# Patient Record
Sex: FEMALE | Race: WHITE | Employment: FULL TIME | URBAN - NONMETROPOLITAN AREA
[De-identification: names, ages, dates, MRNs, and addresses within clinical notes are randomized per-mention and may not be internally consistent; named-entity substitution may affect disease eponyms.]

---

## 2017-12-22 ENCOUNTER — HOSPITAL ENCOUNTER (OUTPATIENT)
Age: 47
Discharge: HOME | End: 2017-12-22
Payer: COMMERCIAL

## 2017-12-22 LAB
ALT SERPL-CCNC: 13 U/L (ref 5–33)
AST SERPL-CCNC: 12 U/L
CHOLESTEROL/HDL RATIO: 4.5
CHOLESTEROL: 217 MG/DL
HDLC SERPL-MCNC: 48 MG/DL
LDL CHOLESTEROL: 151 MG/DL (ref 0–130)
T4 TOTAL: 6.3 UG/DL (ref 4.5–12)
TRIGL SERPL-MCNC: 88 MG/DL
TSH SERPL DL<=0.05 MIU/L-ACNC: 2.36 MIU/L (ref 0.3–5)
VLDLC SERPL CALC-MCNC: ABNORMAL MG/DL (ref 1–30)

## 2017-12-22 PROCEDURE — 80061 LIPID PANEL: CPT

## 2017-12-22 PROCEDURE — 36415 COLL VENOUS BLD VENIPUNCTURE: CPT

## 2017-12-22 PROCEDURE — 84460 ALANINE AMINO (ALT) (SGPT): CPT

## 2017-12-22 PROCEDURE — 84436 ASSAY OF TOTAL THYROXINE: CPT

## 2017-12-22 PROCEDURE — 84443 ASSAY THYROID STIM HORMONE: CPT

## 2017-12-22 PROCEDURE — 84450 TRANSFERASE (AST) (SGOT): CPT

## 2019-08-02 RX ORDER — SIMVASTATIN 40 MG
40 TABLET ORAL NIGHTLY
COMMUNITY
End: 2019-09-03 | Stop reason: ALTCHOICE

## 2019-08-02 RX ORDER — LORATADINE 10 MG/1
10 TABLET ORAL DAILY
COMMUNITY

## 2019-08-02 RX ORDER — DESVENLAFAXINE 50 MG/1
50 TABLET, EXTENDED RELEASE ORAL DAILY
COMMUNITY
End: 2019-08-28 | Stop reason: ALTCHOICE

## 2019-08-02 RX ORDER — LEVOTHYROXINE SODIUM 0.03 MG/1
25 TABLET ORAL DAILY
COMMUNITY
End: 2019-09-03 | Stop reason: SDUPTHER

## 2019-08-02 RX ORDER — FLUTICASONE PROPIONATE 50 MCG
1 SPRAY, SUSPENSION (ML) NASAL DAILY
COMMUNITY

## 2019-08-28 ENCOUNTER — OFFICE VISIT (OUTPATIENT)
Dept: PRIMARY CARE CLINIC | Age: 49
End: 2019-08-28
Payer: COMMERCIAL

## 2019-08-28 VITALS
HEART RATE: 82 BPM | WEIGHT: 199.6 LBS | HEIGHT: 69 IN | RESPIRATION RATE: 18 BRPM | TEMPERATURE: 97.5 F | DIASTOLIC BLOOD PRESSURE: 98 MMHG | BODY MASS INDEX: 29.56 KG/M2 | SYSTOLIC BLOOD PRESSURE: 123 MMHG

## 2019-08-28 DIAGNOSIS — L98.9 BENIGN SKIN LESION OF MULTIPLE SITES: ICD-10-CM

## 2019-08-28 DIAGNOSIS — Z13.220 LIPID SCREENING: ICD-10-CM

## 2019-08-28 DIAGNOSIS — F34.1 DYSTHYMIA: Primary | ICD-10-CM

## 2019-08-28 DIAGNOSIS — R53.83 OTHER FATIGUE: ICD-10-CM

## 2019-08-28 DIAGNOSIS — Z13.1 DIABETES MELLITUS SCREENING: ICD-10-CM

## 2019-08-28 PROCEDURE — 99203 OFFICE O/P NEW LOW 30 MIN: CPT | Performed by: NURSE PRACTITIONER

## 2019-08-28 PROCEDURE — 4004F PT TOBACCO SCREEN RCVD TLK: CPT | Performed by: NURSE PRACTITIONER

## 2019-08-28 PROCEDURE — 3017F COLORECTAL CA SCREEN DOC REV: CPT | Performed by: NURSE PRACTITIONER

## 2019-08-28 PROCEDURE — G8419 CALC BMI OUT NRM PARAM NOF/U: HCPCS | Performed by: NURSE PRACTITIONER

## 2019-08-28 PROCEDURE — G0444 DEPRESSION SCREEN ANNUAL: HCPCS | Performed by: NURSE PRACTITIONER

## 2019-08-28 PROCEDURE — G8427 DOCREV CUR MEDS BY ELIG CLIN: HCPCS | Performed by: NURSE PRACTITIONER

## 2019-08-28 RX ORDER — BUPROPION HYDROCHLORIDE 150 MG/1
150 TABLET ORAL EVERY MORNING
Qty: 90 TABLET | Refills: 0 | Status: SHIPPED | OUTPATIENT
Start: 2019-08-28 | End: 2019-10-15 | Stop reason: ALTCHOICE

## 2019-08-28 RX ORDER — DESVENLAFAXINE 50 MG/1
TABLET, EXTENDED RELEASE ORAL
Refills: 1 | COMMUNITY
Start: 2019-07-22 | End: 2020-02-10 | Stop reason: SDUPTHER

## 2019-08-28 ASSESSMENT — PATIENT HEALTH QUESTIONNAIRE - PHQ9
9. THOUGHTS THAT YOU WOULD BE BETTER OFF DEAD, OR OF HURTING YOURSELF: 0
SUM OF ALL RESPONSES TO PHQ9 QUESTIONS 1 & 2: 4
5. POOR APPETITE OR OVEREATING: 3
10. IF YOU CHECKED OFF ANY PROBLEMS, HOW DIFFICULT HAVE THESE PROBLEMS MADE IT FOR YOU TO DO YOUR WORK, TAKE CARE OF THINGS AT HOME, OR GET ALONG WITH OTHER PEOPLE: 0
SUM OF ALL RESPONSES TO PHQ QUESTIONS 1-9: 10
SUM OF ALL RESPONSES TO PHQ QUESTIONS 1-9: 10
7. TROUBLE CONCENTRATING ON THINGS, SUCH AS READING THE NEWSPAPER OR WATCHING TELEVISION: 0
4. FEELING TIRED OR HAVING LITTLE ENERGY: 3
1. LITTLE INTEREST OR PLEASURE IN DOING THINGS: 2
2. FEELING DOWN, DEPRESSED OR HOPELESS: 2
3. TROUBLE FALLING OR STAYING ASLEEP: 0
6. FEELING BAD ABOUT YOURSELF - OR THAT YOU ARE A FAILURE OR HAVE LET YOURSELF OR YOUR FAMILY DOWN: 0
8. MOVING OR SPEAKING SO SLOWLY THAT OTHER PEOPLE COULD HAVE NOTICED. OR THE OPPOSITE, BEING SO FIGETY OR RESTLESS THAT YOU HAVE BEEN MOVING AROUND A LOT MORE THAN USUAL: 0

## 2019-08-28 ASSESSMENT — ENCOUNTER SYMPTOMS
VOMITING: 0
VISUAL CHANGE: 0
SORE THROAT: 0
COUGH: 0
SHORTNESS OF BREATH: 0
WHEEZING: 0
DIARRHEA: 0
RHINORRHEA: 0
CONSTIPATION: 0
NAUSEA: 0
ABDOMINAL PAIN: 0

## 2019-08-28 NOTE — PROGRESS NOTES
11/19/2016    ALKPHOS 65 11/19/2016    AST 12 12/22/2017    ALT 13 12/22/2017     Lab Results   Component Value Date    WBC 9.6 11/19/2016    RBC 4.65 11/19/2016    HGB 14.0 11/19/2016    HCT 43.2 11/19/2016    MCV 92.9 11/19/2016    MCH 30.1 11/19/2016    MCHC 32.4 11/19/2016    RDW 13.8 11/19/2016     11/19/2016    MPV 9.7 11/19/2016     Lab Results   Component Value Date    TSH 2.36 12/22/2017     Lab Results   Component Value Date    CHOL 217 12/22/2017    HDL 48 12/22/2017       Assessment/Plan:      Diagnosis Orders   1. Dysthymia  buPROPion (WELLBUTRIN XL) 150 MG extended release tablet   2. Benign skin lesion of multiple sites  [address], DO Anneliese, General Surgery, [place]   3. Other fatigue  T4, Free    TSH without Reflex   4. Lipid screening  Lipid Panel   5. Diabetes mellitus screening  Glucose, Fasting     We will add Wellbutrin 150 mg daily to her current Pristiq regimen. We will see if this improves her fatigue and weepiness. She will be referred to general surgery for skin lesions which I believe to be an actinic keratosis. We will repeat thyroid and lipid studies as they have not been done in several years. 1.  Leilani Webber received counseling on the following healthy behaviors: nutrition, exercise and medication adherence  2. Patient given educational materials - see patient instructions  3. Was a self-tracking handout given in paper form or via [employer]? No  If yes, see orders or list here. 4.  Discussed use, benefit, and side effects of prescribed medications. Barriers to medication compliance addressed. All patient questions answered. Pt voiced understanding. 5.  Reviewed prior labs and health maintenance  6. Continue current medications, diet and exercise.     Completed Refills   Requested Prescriptions     Signed Prescriptions Disp Refills    buPROPion (WELLBUTRIN XL) 150 MG extended release tablet 90 tablet 0     Sig: Take 1 tablet by mouth every morning         Return

## 2019-08-31 ENCOUNTER — HOSPITAL ENCOUNTER (OUTPATIENT)
Age: 49
Discharge: HOME | End: 2019-08-31
Payer: COMMERCIAL

## 2019-08-31 DIAGNOSIS — R53.83 OTHER FATIGUE: ICD-10-CM

## 2019-08-31 DIAGNOSIS — Z13.220 LIPID SCREENING: ICD-10-CM

## 2019-08-31 DIAGNOSIS — Z13.1 DIABETES MELLITUS SCREENING: ICD-10-CM

## 2019-08-31 LAB
CHOLESTEROL/HDL RATIO: 4.8
CHOLESTEROL: 249 MG/DL
GLUCOSE FASTING: 98 MG/DL (ref 70–99)
HDLC SERPL-MCNC: 52 MG/DL
LDL CHOLESTEROL: 180 MG/DL (ref 0–130)
THYROXINE, FREE: 0.86 NG/DL (ref 0.93–1.7)
TRIGL SERPL-MCNC: 83 MG/DL
TSH SERPL DL<=0.05 MIU/L-ACNC: 3.26 MIU/L (ref 0.3–5)
VLDLC SERPL CALC-MCNC: ABNORMAL MG/DL (ref 1–30)

## 2019-08-31 PROCEDURE — 82947 ASSAY GLUCOSE BLOOD QUANT: CPT

## 2019-08-31 PROCEDURE — 84443 ASSAY THYROID STIM HORMONE: CPT

## 2019-08-31 PROCEDURE — 84439 ASSAY OF FREE THYROXINE: CPT

## 2019-08-31 PROCEDURE — 80061 LIPID PANEL: CPT

## 2019-08-31 PROCEDURE — 36415 COLL VENOUS BLD VENIPUNCTURE: CPT

## 2019-09-03 ENCOUNTER — TELEPHONE (OUTPATIENT)
Dept: PRIMARY CARE CLINIC | Age: 49
End: 2019-09-03

## 2019-09-03 DIAGNOSIS — E78.5 DYSLIPIDEMIA: ICD-10-CM

## 2019-09-03 DIAGNOSIS — E03.1 CONGENITAL HYPOTHYROIDISM WITHOUT GOITER: Primary | ICD-10-CM

## 2019-09-03 RX ORDER — LEVOTHYROXINE SODIUM 0.05 MG/1
50 TABLET ORAL DAILY
Qty: 90 TABLET | Refills: 0 | Status: SHIPPED | OUTPATIENT
Start: 2019-09-03 | End: 2019-12-02 | Stop reason: SDUPTHER

## 2019-09-03 RX ORDER — ATORVASTATIN CALCIUM 40 MG/1
40 TABLET, FILM COATED ORAL DAILY
Qty: 90 TABLET | Refills: 1 | Status: SHIPPED | OUTPATIENT
Start: 2019-09-03 | End: 2020-03-03 | Stop reason: SDUPTHER

## 2019-09-04 ENCOUNTER — OFFICE VISIT (OUTPATIENT)
Dept: SURGERY | Age: 49
End: 2019-09-04
Payer: COMMERCIAL

## 2019-09-04 ENCOUNTER — TELEPHONE (OUTPATIENT)
Dept: SURGERY | Age: 49
End: 2019-09-04

## 2019-09-04 VITALS
HEART RATE: 86 BPM | SYSTOLIC BLOOD PRESSURE: 124 MMHG | BODY MASS INDEX: 29.68 KG/M2 | TEMPERATURE: 98.9 F | RESPIRATION RATE: 16 BRPM | WEIGHT: 200.4 LBS | DIASTOLIC BLOOD PRESSURE: 85 MMHG | HEIGHT: 69 IN

## 2019-09-04 DIAGNOSIS — L98.9 SKIN LESIONS: Primary | ICD-10-CM

## 2019-09-04 PROCEDURE — G8427 DOCREV CUR MEDS BY ELIG CLIN: HCPCS | Performed by: SURGERY

## 2019-09-04 PROCEDURE — G8419 CALC BMI OUT NRM PARAM NOF/U: HCPCS | Performed by: SURGERY

## 2019-09-04 PROCEDURE — 99202 OFFICE O/P NEW SF 15 MIN: CPT | Performed by: SURGERY

## 2019-09-04 PROCEDURE — 4004F PT TOBACCO SCREEN RCVD TLK: CPT | Performed by: SURGERY

## 2019-09-04 PROCEDURE — 3017F COLORECTAL CA SCREEN DOC REV: CPT | Performed by: SURGERY

## 2019-09-04 NOTE — TELEPHONE ENCOUNTER
Patient called stating she will wait until January 2020 to get the lesions removed. She will call back early December to schedule once she looks at her schedule.

## 2019-09-09 SDOH — SOCIAL STABILITY: SOCIAL NETWORK: ARE YOU MARRIED, WIDOWED, DIVORCED, SEPARATED, NEVER MARRIED, OR LIVING WITH A PARTNER?: MARRIED

## 2019-09-23 ENCOUNTER — TELEPHONE (OUTPATIENT)
Dept: PRIMARY CARE CLINIC | Age: 49
End: 2019-09-23

## 2019-09-23 DIAGNOSIS — F34.1 DYSTHYMIA: Primary | ICD-10-CM

## 2019-09-23 DIAGNOSIS — E03.1 CONGENITAL HYPOTHYROIDISM WITHOUT GOITER: ICD-10-CM

## 2019-09-24 RX ORDER — ARIPIPRAZOLE 2 MG/1
2 TABLET ORAL DAILY
Qty: 30 TABLET | Refills: 0 | Status: SHIPPED | OUTPATIENT
Start: 2019-09-24 | End: 2019-10-15 | Stop reason: SDUPTHER

## 2019-10-15 ENCOUNTER — OFFICE VISIT (OUTPATIENT)
Dept: PRIMARY CARE CLINIC | Age: 49
End: 2019-10-15
Payer: COMMERCIAL

## 2019-10-15 VITALS
BODY MASS INDEX: 29.82 KG/M2 | RESPIRATION RATE: 16 BRPM | TEMPERATURE: 99 F | WEIGHT: 201.3 LBS | HEIGHT: 69 IN | HEART RATE: 88 BPM | DIASTOLIC BLOOD PRESSURE: 85 MMHG | SYSTOLIC BLOOD PRESSURE: 127 MMHG

## 2019-10-15 DIAGNOSIS — Z12.11 SCREENING FOR COLORECTAL CANCER: Primary | ICD-10-CM

## 2019-10-15 DIAGNOSIS — Z12.12 SCREENING FOR COLORECTAL CANCER: Primary | ICD-10-CM

## 2019-10-15 DIAGNOSIS — F34.1 DYSTHYMIA: ICD-10-CM

## 2019-10-15 PROCEDURE — 4004F PT TOBACCO SCREEN RCVD TLK: CPT | Performed by: NURSE PRACTITIONER

## 2019-10-15 PROCEDURE — 3017F COLORECTAL CA SCREEN DOC REV: CPT | Performed by: NURSE PRACTITIONER

## 2019-10-15 PROCEDURE — G8484 FLU IMMUNIZE NO ADMIN: HCPCS | Performed by: NURSE PRACTITIONER

## 2019-10-15 PROCEDURE — G8417 CALC BMI ABV UP PARAM F/U: HCPCS | Performed by: NURSE PRACTITIONER

## 2019-10-15 PROCEDURE — 99213 OFFICE O/P EST LOW 20 MIN: CPT | Performed by: NURSE PRACTITIONER

## 2019-10-15 PROCEDURE — G8427 DOCREV CUR MEDS BY ELIG CLIN: HCPCS | Performed by: NURSE PRACTITIONER

## 2019-10-15 RX ORDER — ARIPIPRAZOLE 5 MG/1
5 TABLET ORAL DAILY
Qty: 90 TABLET | Refills: 0 | Status: SHIPPED | OUTPATIENT
Start: 2019-10-15 | End: 2020-01-21 | Stop reason: SDUPTHER

## 2019-10-15 ASSESSMENT — ENCOUNTER SYMPTOMS: CONSTIPATION: 0

## 2019-10-21 LAB
CONTROL: PRESENT
HEMOCCULT STL QL: NEGATIVE

## 2019-10-22 ENCOUNTER — TELEPHONE (OUTPATIENT)
Dept: PRIMARY CARE CLINIC | Age: 49
End: 2019-10-22

## 2019-10-22 DIAGNOSIS — Z12.12 SCREENING FOR COLORECTAL CANCER: ICD-10-CM

## 2019-10-22 DIAGNOSIS — Z12.11 SCREENING FOR COLORECTAL CANCER: ICD-10-CM

## 2019-10-22 PROCEDURE — 82274 ASSAY TEST FOR BLOOD FECAL: CPT | Performed by: NURSE PRACTITIONER

## 2019-12-02 DIAGNOSIS — E03.1 CONGENITAL HYPOTHYROIDISM WITHOUT GOITER: ICD-10-CM

## 2019-12-02 RX ORDER — LEVOTHYROXINE SODIUM 0.05 MG/1
50 TABLET ORAL DAILY
Qty: 90 TABLET | Refills: 0 | Status: SHIPPED | OUTPATIENT
Start: 2019-12-02 | End: 2020-03-03 | Stop reason: SDUPTHER

## 2019-12-10 ENCOUNTER — TELEPHONE (OUTPATIENT)
Dept: PRIMARY CARE CLINIC | Age: 49
End: 2019-12-10

## 2019-12-26 ENCOUNTER — TELEPHONE (OUTPATIENT)
Dept: PRIMARY CARE CLINIC | Age: 49
End: 2019-12-26

## 2019-12-28 ENCOUNTER — HOSPITAL ENCOUNTER (OUTPATIENT)
Age: 49
Discharge: HOME | End: 2019-12-28
Payer: COMMERCIAL

## 2019-12-28 DIAGNOSIS — E03.1 CONGENITAL HYPOTHYROIDISM WITHOUT GOITER: ICD-10-CM

## 2019-12-28 DIAGNOSIS — E78.5 DYSLIPIDEMIA: ICD-10-CM

## 2019-12-28 LAB
CHOLESTEROL/HDL RATIO: 3.2
CHOLESTEROL: 174 MG/DL
HDLC SERPL-MCNC: 55 MG/DL
LDL CHOLESTEROL: 96 MG/DL (ref 0–130)
THYROXINE, FREE: 1.21 NG/DL (ref 0.93–1.7)
TRIGL SERPL-MCNC: 116 MG/DL
TSH SERPL DL<=0.05 MIU/L-ACNC: 1.26 MIU/L (ref 0.3–5)
VLDLC SERPL CALC-MCNC: NORMAL MG/DL (ref 1–30)

## 2019-12-28 PROCEDURE — 84439 ASSAY OF FREE THYROXINE: CPT

## 2019-12-28 PROCEDURE — 36415 COLL VENOUS BLD VENIPUNCTURE: CPT

## 2019-12-28 PROCEDURE — 80061 LIPID PANEL: CPT

## 2019-12-28 PROCEDURE — 84443 ASSAY THYROID STIM HORMONE: CPT

## 2019-12-30 ENCOUNTER — TELEPHONE (OUTPATIENT)
Dept: PRIMARY CARE CLINIC | Age: 49
End: 2019-12-30

## 2020-01-21 RX ORDER — ARIPIPRAZOLE 5 MG/1
5 TABLET ORAL DAILY
Qty: 90 TABLET | Refills: 0 | Status: SHIPPED | OUTPATIENT
Start: 2020-01-21 | End: 2020-05-21 | Stop reason: SDUPTHER

## 2020-01-21 NOTE — TELEPHONE ENCOUNTER
Health Maintenance   Topic Date Due    DTaP/Tdap/Td vaccine (1 - Tdap) 08/28/2020 (Originally 6/23/1980)    Cervical cancer screen  08/28/2020 (Originally 6/23/1990)    Shingles Vaccine (1 of 2) 08/28/2020 (Originally 6/23/2019)    HIV screen  08/28/2020 (Originally 6/23/1984)    Flu vaccine (1) 10/15/2020 (Originally 9/1/2019)    Pneumococcal 0-64 years Vaccine (1 of 1 - PPSV23) 10/15/2020 (Originally 6/23/1975)    Breast cancer screen  10/09/2020    Colon Cancer Screen FIT/FOBT  10/21/2020    Lipid screen  12/28/2020    Diabetes screen  08/31/2022             (applicable per patient's age: Cancer Screenings, Depression Screening, Fall Risk Screening, Immunizations)    LDL Cholesterol (mg/dL)   Date Value   12/28/2019 96     AST (U/L)   Date Value   12/22/2017 12     ALT (U/L)   Date Value   12/22/2017 13     BUN (mg/dL)   Date Value   11/19/2016 13      (goal A1C is < 7)   (goal LDL is <100) need 30-50% reduction from baseline     BP Readings from Last 3 Encounters:   10/15/19 127/85   09/04/19 124/85   08/28/19 (!) 123/98    (goal /80)      All Future Testing planned in [facility]:      Next Visit Date:  Future Appointments   Date Time Provider Buzz Garcia   4/15/2020  8:40 AM Luz Gonzalez, APRN - CNP [facility]            There is no problem list on file for this patient.

## 2020-02-10 RX ORDER — DESVENLAFAXINE 50 MG/1
TABLET, EXTENDED RELEASE ORAL
Qty: 90 TABLET | Refills: 1 | Status: SHIPPED | OUTPATIENT
Start: 2020-02-10 | End: 2020-02-11 | Stop reason: SDUPTHER

## 2020-02-11 ENCOUNTER — OFFICE VISIT (OUTPATIENT)
Dept: PRIMARY CARE CLINIC | Age: 50
End: 2020-02-11
Payer: COMMERCIAL

## 2020-02-11 VITALS
HEART RATE: 83 BPM | HEIGHT: 69 IN | DIASTOLIC BLOOD PRESSURE: 90 MMHG | TEMPERATURE: 98.4 F | SYSTOLIC BLOOD PRESSURE: 126 MMHG | WEIGHT: 192 LBS | BODY MASS INDEX: 28.44 KG/M2

## 2020-02-11 PROCEDURE — 99214 OFFICE O/P EST MOD 30 MIN: CPT | Performed by: NURSE PRACTITIONER

## 2020-02-11 RX ORDER — DESVENLAFAXINE 100 MG/1
TABLET, EXTENDED RELEASE ORAL
Qty: 90 TABLET | Refills: 1 | Status: SHIPPED | OUTPATIENT
Start: 2020-02-11 | End: 2020-08-06 | Stop reason: SDUPTHER

## 2020-02-11 RX ORDER — ALPRAZOLAM 0.5 MG/1
0.5 TABLET ORAL 3 TIMES DAILY PRN
Qty: 90 TABLET | Refills: 0 | Status: SHIPPED | OUTPATIENT
Start: 2020-02-11 | End: 2020-02-11 | Stop reason: SDUPTHER

## 2020-02-11 RX ORDER — ALPRAZOLAM 0.5 MG/1
0.5 TABLET ORAL 3 TIMES DAILY PRN
Qty: 90 TABLET | Refills: 0 | Status: SHIPPED | OUTPATIENT
Start: 2020-02-11 | End: 2020-07-06 | Stop reason: SDUPTHER

## 2020-02-11 ASSESSMENT — ENCOUNTER SYMPTOMS
SHORTNESS OF BREATH: 0
NAUSEA: 0
COUGH: 0
VISUAL CHANGE: 0
VOMITING: 0
ABDOMINAL PAIN: 0
DIARRHEA: 0

## 2020-02-11 ASSESSMENT — PATIENT HEALTH QUESTIONNAIRE - PHQ9
1. LITTLE INTEREST OR PLEASURE IN DOING THINGS: 1
2. FEELING DOWN, DEPRESSED OR HOPELESS: 1
SUM OF ALL RESPONSES TO PHQ QUESTIONS 1-9: 2
SUM OF ALL RESPONSES TO PHQ QUESTIONS 1-9: 2
SUM OF ALL RESPONSES TO PHQ9 QUESTIONS 1 & 2: 2

## 2020-02-11 NOTE — PROGRESS NOTES
Name: Mary Giraldo  : 1970         Chief Complaint:     Chief Complaint   Patient presents with    Anxiety     would like to discuss increased anxiety and depression, x3 weeks, denies thoughts of suicide       History of Present Illness:      Mary Giraldo is a 47 y.o.  female who presents with Anxiety (would like to discuss increased anxiety and depression, x3 weeks, denies thoughts of suicide)      Edie Dumas is here today for a routine office visit. PTSD-her stepfather was involved in an accident where he caused a fatality. She states she is very concerned about her stepfather at this time as he is facing an impending investigation. Patient states she is unable to sleep and has had constant thought about the death of the involved party. Mental Health Problem   The primary symptoms include dysphoric mood and negative symptoms. The primary symptoms do not include delusions, hallucinations, bizarre behavior, disorganized speech or somatic symptoms. The current episode started more than 1 month ago. This is a new problem. The negative symptoms began more than 2 weeks ago. The negative symptoms appear to have been unchanged since their onset. The negative symptoms include attention impairment and anhedonia. The onset of the illness is precipitated by a stressful event. The degree of incapacity that she is experiencing as a consequence of her illness is moderate. Sequelae of the illness include harmed interpersonal relations. Additional symptoms of the illness include anhedonia, insomnia, fatigue, attention impairment and distractible. Additional symptoms of the illness do not include hypersomnia, appetite change, unexpected weight change, agitation, psychomotor retardation, feelings of worthlessness, euphoric mood, increased goal-directed activity, flight of ideas, inflated self-esteem, decreased need for sleep, poor judgment, visual change, headaches, abdominal pain or seizures.  She does not admit to suicidal ideas. She does not have a plan to commit suicide. She does not contemplate harming herself. She has not already injured self. She does not contemplate injuring another person. She has not already  injured another person. Past Medical History:     Past Medical History:   Diagnosis Date    Anxiety     Depression     Fibroadenoma of left breast     Hyperlipidemia     Multinodular thyroid       Reviewed all health maintenance requirements and ordered appropriate tests  There are no preventive care reminders to display for this patient. Past Surgical History:     Past Surgical History:   Procedure Laterality Date    EYE SURGERY      age 9    TUBAL LIGATION          Medications:       Prior to Admission medications    Medication Sig Start Date End Date Taking? Authorizing Provider   desvenlafaxine succinate (PRISTIQ) 100 MG TB24 extended release tablet TAKE 1 TABLET BY MOUTH ONCE DAILY 2/11/20  Yes LUIS Turner CNP   ALPRAZolam (XANAX) 0.5 MG tablet Take 1 tablet by mouth 3 times daily as needed for Sleep or Anxiety for up to 30 days. 2/11/20 3/12/20 Yes LUIS Turner CNP   ARIPiprazole (ABILIFY) 5 MG tablet Take 1 tablet by mouth daily 1/21/20  Yes LUIS Schmitt CNP   levothyroxine (SYNTHROID) 50 MCG tablet Take 1 tablet by mouth Daily 12/2/19  Yes LUIS Schmitt CNP   atorvastatin (LIPITOR) 40 MG tablet Take 1 tablet by mouth daily 9/3/19  Yes LUIS Turner CNP   loratadine (CLARITIN) 10 MG tablet Take 10 mg by mouth daily   Yes Historical Provider, MD   fluticasone (FLONASE) 50 MCG/ACT nasal spray 1 spray by Each Nostril route daily   Yes Historical Provider, MD        Allergies:       Pcn [penicillins]; Clindamycin hcl; and Panlor [apap-caff-dihydrocodeine]    Social History:     Tobacco:    reports that she has been smoking. She has a 30.00 pack-year smoking history.  She has never used smokeless tobacco.  Alcohol:      reports current alcohol use. Drug Use:  reports no history of drug use. Family History:     Family History   Problem Relation Age of Onset   Lucila Hammonds ADHD Mother     Cancer Father        Review of Systems:     Positive and Negative as described in HPI    Review of Systems   Constitutional: Positive for fatigue. Negative for appetite change and unexpected weight change. Eyes: Negative for visual disturbance. Respiratory: Negative for cough and shortness of breath. Cardiovascular: Negative for chest pain and palpitations. Gastrointestinal: Negative for abdominal pain, diarrhea, nausea and vomiting. Musculoskeletal: Negative for neck pain and neck stiffness. Skin: Negative for rash. Neurological: Negative for dizziness, seizures, syncope and headaches. Psychiatric/Behavioral: Positive for decreased concentration, dysphoric mood and sleep disturbance. Negative for agitation, behavioral problems, confusion, hallucinations, self-injury and suicidal ideas. The patient is nervous/anxious and has insomnia. The patient is not hyperactive. Physical Exam:   Vitals:  BP (!) 126/90 (Site: Left Upper Arm, Position: Sitting, Cuff Size: Medium Adult)   Pulse 83   Temp 98.4 °F (36.9 °C) (Temporal)   Ht 5' 8.5\" (1.74 m)   Wt 192 lb (87.1 kg)   BMI 28.77 kg/m²     Physical Exam  Vitals signs and nursing note reviewed. Constitutional:       General: She is not in acute distress. Appearance: Normal appearance. Eyes:      General: No scleral icterus. Conjunctiva/sclera: Conjunctivae normal.   Neck:      Musculoskeletal: Normal range of motion and neck supple. Cardiovascular:      Rate and Rhythm: Normal rate and regular rhythm. Heart sounds: No murmur. Pulmonary:      Effort: Pulmonary effort is normal.      Breath sounds: Normal breath sounds. Skin:     General: Skin is warm and dry. Findings: No rash. Neurological:      Mental Status: She is alert and oriented to person, place, and time. Psychiatric:         Attention and Perception: Attention normal.         Mood and Affect: Mood is anxious. Mood is not depressed. Affect is tearful. Speech: Speech normal.         Behavior: Behavior normal. Behavior is cooperative. Thought Content: Thought content normal. Thought content does not include homicidal or suicidal ideation. Thought content does not include homicidal or suicidal plan. Cognition and Memory: Cognition normal.         Judgment: Judgment normal.      Comments: Tearful at times         Data:     Lab Results   Component Value Date     11/19/2016    K 4.3 11/19/2016     11/19/2016    CO2 21 11/19/2016    BUN 13 11/19/2016    CREATININE 0.77 11/19/2016    GLUCOSE 89 11/19/2016    PROT 7.3 11/19/2016    LABALBU 4.3 11/19/2016    BILITOT 0.33 11/19/2016    ALKPHOS 65 11/19/2016    AST 12 12/22/2017    ALT 13 12/22/2017     Lab Results   Component Value Date    WBC 9.6 11/19/2016    RBC 4.65 11/19/2016    HGB 14.0 11/19/2016    HCT 43.2 11/19/2016    MCV 92.9 11/19/2016    MCH 30.1 11/19/2016    MCHC 32.4 11/19/2016    RDW 13.8 11/19/2016     11/19/2016    MPV 9.7 11/19/2016     Lab Results   Component Value Date    TSH 1.26 12/28/2019     Lab Results   Component Value Date    CHOL 174 12/28/2019    HDL 55 12/28/2019       Assessment/Plan:      Diagnosis Orders   1. Acute posttraumatic stress disorder  ALPRAZolam (XANAX) 0.5 MG tablet    DISCONTINUED: ALPRAZolam (XANAX) 0.5 MG tablet   2. Dysthymia  desvenlafaxine succinate (PRISTIQ) 100 MG TB24 extended release tablet     We will increase Pristiq to 100 mg daily. We will add alprazolam as needed. I also highly encouraged her to seek counseling at this point. She will follow-up in 30 days or sooner if any worsening. 1.  Braydon Curran received counseling on the following healthy behaviors: medication adherence  2. Patient given educational materials - see patient instructions  3.   Was a self-tracking handout given in paper form or via [employer]? No  If yes, see orders or list here. 4.  Discussed use, benefit, and side effects of prescribed medications. Barriers to medication compliance addressed. All patient questions answered. Pt voiced understanding. 5.  Reviewed prior labs and health maintenance  6. Continue current medications, diet and exercise. Completed Refills   Requested Prescriptions     Signed Prescriptions Disp Refills    desvenlafaxine succinate (PRISTIQ) 100 MG TB24 extended release tablet 90 tablet 1     Sig: TAKE 1 TABLET BY MOUTH ONCE DAILY    ALPRAZolam (XANAX) 0.5 MG tablet 90 tablet 0     Sig: Take 1 tablet by mouth 3 times daily as needed for Sleep or Anxiety for up to 30 days. Return in about 4 weeks (around 3/10/2020) for check up.

## 2020-02-11 NOTE — PATIENT INSTRUCTIONS
you feel better. · Think positively. Challenge negative thoughts with statements such as \"I am hopeful\"; \"Things will get better\"; and \"I can ask for the help I need. \" Write down these statements and read them often, even if you don't believe them yet. · Be patient with yourself. It took time for your depression to develop, and it will take time for your symptoms to improve. Do not take on too much or be too hard on yourself. · Learn all you can about depression from written and online materials. · Check out behavioral health classes to learn more about dealing with depression. · Keep the numbers for these national suicide hotlines: 6-696-930-TALK (0-646.771.5666) and 0-710-JZJTADP (6-742.846.3102). If you or someone you know talks about suicide or feeling hopeless, get help right away. When should you call for help? Call 911 anytime you think you may need emergency care. For example, call if:    · You feel you cannot stop from hurting yourself or someone else.   [facility] your doctor now or seek immediate medical care if:    · You hear voices.     · You feel much more depressed.    Watch closely for changes in your health, and be sure to contact your doctor if:    · You are having problems with your depression medicine.     · You are not getting better as expected. Where can you learn more? Go to https://Medocity.Xinguodu. org and sign in to your [employer] account. Enter T573 in the [employer] box to learn more about \"Depression Treatment: Care Instructions. \"     If you do not have an account, please click on the \"Sign Up Now\" link. Current as of: May 28, 2019  Content Version: 12.3  © 9448-3603 Healthwise, Incorporated. Care instructions adapted under license by [facility] ([facility]). If you have questions about a medical condition or this instruction, always ask your healthcare professional. [address] any warranty or liability for your use of this information. piero complete the survey to enable us to provide the highest quality of care to you and your family. If you cannot score us a very good on any question, please call the office to discuss how we could of made your experience a very good one. Thank you. Patient Education        Anxiety Disorder: Care Instructions  Your Care Instructions    Anxiety is a normal reaction to stress. Difficult situations can cause you to have symptoms such as sweaty palms and a nervous feeling. In an anxiety disorder, the symptoms are far more severe. Constant worry, muscle tension, trouble sleeping, nausea and diarrhea, and other symptoms can make normal daily activities difficult or impossible. These symptoms may occur for no reason, and they can affect your work, school, or social life. Medicines, counseling, and self-care can all help. Follow-up care is a key part of your treatment and safety. Be sure to make and go to all appointments, and call your doctor if you are having problems. It's also a good idea to know your test results and keep a list of the medicines you take. How can you care for yourself at home? · Take medicines exactly as directed. Call your doctor if you think you are having a problem with your medicine. · Go to your counseling sessions and follow-up appointments. · Recognize and accept your anxiety. Then, when you are in a situation that makes you anxious, say to yourself, \"This is not an emergency. I feel uncomfortable, but I am not in danger. I can keep going even if I feel anxious. \"  · Be kind to your body:  ? Relieve tension with exercise or a massage. ? Get enough rest.  ? Avoid alcohol, caffeine, nicotine, and illegal drugs. They can increase your anxiety level and cause sleep problems. ? Learn and do relaxation techniques. See below for more about these techniques. · Engage your mind. Get out and do something you enjoy. Go to a funny movie, or take a walk or hike. Plan your day.  Having too body. Sometimes people fall asleep during relaxation, but they usually wake up shortly afterward. · Always give yourself time to return to full alertness before you drive a car or do anything that might cause an accident if you are not fully alert. Never play a relaxation tape while you drive a car. When should you call for help? Call 911 anytime you think you may need emergency care. For example, call if:    · You feel you cannot stop from hurting yourself or someone else.   [place] the numbers for these national suicide hotlines: 0-927-892-TALK (8-390.302.9563) and 3-822-TYTHEET (9-731.561.1336). If you or someone you know talks about suicide or feeling hopeless, get help right away.   Watch closely for changes in your health, and be sure to contact your doctor if:    · You have anxiety or fear that affects your life.     · You have symptoms of anxiety that are new or different from those you had before. Where can you learn more? Go to https://[employer].[email or web address] org and sign in to your imageloop account. Enter P754 in the [employer] box to learn more about \"Anxiety Disorder: Care Instructions. \"     If you do not have an account, please click on the \"Sign Up Now\" link. Current as of: May 28, 2019  Content Version: 12.3  © 7959-5189 Healthwise, Incorporated. Care instructions adapted under license by [facility] ([facility]). If you have questions about a medical condition or this instruction, always ask your healthcare professional. [place] [address] any warranty or liability for your use of this information.

## 2020-03-03 RX ORDER — ATORVASTATIN CALCIUM 40 MG/1
40 TABLET, FILM COATED ORAL DAILY
Qty: 90 TABLET | Refills: 1 | Status: SHIPPED | OUTPATIENT
Start: 2020-03-03 | End: 2020-11-09

## 2020-03-03 RX ORDER — LEVOTHYROXINE SODIUM 0.05 MG/1
50 TABLET ORAL DAILY
Qty: 90 TABLET | Refills: 0 | Status: SHIPPED | OUTPATIENT
Start: 2020-03-03 | End: 2020-05-21 | Stop reason: SDUPTHER

## 2020-03-03 NOTE — TELEPHONE ENCOUNTER
Phone call from patient requesting refills be sent to [facility]. Health Maintenance   Topic Date Due    DTaP/Tdap/Td vaccine (1 - Tdap) 08/28/2020 (Originally 6/23/1980)    Cervical cancer screen  08/28/2020 (Originally 6/23/1990)    Shingles Vaccine (1 of 2) 08/28/2020 (Originally 6/23/2019)    HIV screen  08/28/2020 (Originally 6/23/1984)    Flu vaccine (1) 10/15/2020 (Originally 9/1/2019)    Pneumococcal 0-64 years Vaccine (1 of 1 - PPSV23) 10/15/2020 (Originally 6/23/1975)    Breast cancer screen  10/09/2020    Colon Cancer Screen FIT/FOBT  10/21/2020    Lipid screen  12/28/2020    Diabetes screen  08/31/2022    Hepatitis A vaccine  Aged Out    Hepatitis B vaccine  Aged Out    Hib vaccine  Aged Out    Meningococcal (ACWY) vaccine  Aged Out             (applicable per patient's age: Cancer Screenings, Depression Screening, Fall Risk Screening, Immunizations)    LDL Cholesterol (mg/dL)   Date Value   12/28/2019 96     AST (U/L)   Date Value   12/22/2017 12     ALT (U/L)   Date Value   12/22/2017 13     BUN (mg/dL)   Date Value   11/19/2016 13      (goal A1C is < 7)   (goal LDL is <100) need 30-50% reduction from baseline     BP Readings from Last 3 Encounters:   02/11/20 (!) 126/90   10/15/19 127/85   09/04/19 124/85    (goal /80)      All Future Testing planned in [facility]:      Next Visit Date:  Future Appointments   Date Time Provider Buzz Garcia   3/10/2020 11:00 AM LUIS Camejo - CNP [facility]   4/15/2020  8:40 AM LUIS Camejo - CNP [facility]            There is no problem list on file for this patient.

## 2020-03-10 ENCOUNTER — OFFICE VISIT (OUTPATIENT)
Dept: PRIMARY CARE CLINIC | Age: 50
End: 2020-03-10
Payer: COMMERCIAL

## 2020-03-10 VITALS
BODY MASS INDEX: 28.87 KG/M2 | TEMPERATURE: 97.4 F | DIASTOLIC BLOOD PRESSURE: 84 MMHG | HEART RATE: 87 BPM | WEIGHT: 194.9 LBS | SYSTOLIC BLOOD PRESSURE: 124 MMHG | HEIGHT: 69 IN

## 2020-03-10 PROCEDURE — 99213 OFFICE O/P EST LOW 20 MIN: CPT | Performed by: NURSE PRACTITIONER

## 2020-03-10 ASSESSMENT — ENCOUNTER SYMPTOMS
ABDOMINAL PAIN: 0
VISUAL CHANGE: 0

## 2020-03-10 NOTE — PROGRESS NOTES
Name: Quinn Ibrahim  : 1970         Chief Complaint:     Chief Complaint   Patient presents with   [address] Problem      States \"med increase has helped, feeling much better\"       History of Present Illness:      Quinn Ibrahim is a 47 y.o.  female who presents with Mental Health Problem ( States \"med increase has helped, feeling much better\")      Bucky Hannah is here today for a routine office visit. PTSD-her stepfather was involved in an accident where he caused a fatality. She states she is very concerned about her stepfather at this time as he is facing an impending investigation. Patient states she is unable to sleep and has had constant thought about the death of the involved party. UPDATE 03/10/2020-greatly improved with use of increased dose of Pristiq. Patient only using alprazolam infrequently if at all. Mental Health Problem   The primary symptoms do not include dysphoric mood, delusions, hallucinations, bizarre behavior, disorganized speech, negative symptoms or somatic symptoms. The current episode started more than 1 month ago. This is a new problem. The onset of the illness is precipitated by a stressful event. The degree of incapacity that she is experiencing as a consequence of her illness is moderate. Sequelae of the illness include harmed interpersonal relations. Additional symptoms of the illness do not include anhedonia, insomnia, hypersomnia, appetite change, unexpected weight change, fatigue, agitation, psychomotor retardation, feelings of worthlessness, attention impairment, euphoric mood, increased goal-directed activity, flight of ideas, inflated self-esteem, decreased need for sleep, distractible, poor judgment, visual change, headaches, abdominal pain or seizures. She does not admit to suicidal ideas. She does not have a plan to commit suicide. She does not contemplate harming herself. She has not already injured self.  She does not contemplate injuring another 11/19/2016    CO2 21 11/19/2016    BUN 13 11/19/2016    CREATININE 0.77 11/19/2016    GLUCOSE 89 11/19/2016    PROT 7.3 11/19/2016    LABALBU 4.3 11/19/2016    BILITOT 0.33 11/19/2016    ALKPHOS 65 11/19/2016    AST 12 12/22/2017    ALT 13 12/22/2017     Lab Results   Component Value Date    WBC 9.6 11/19/2016    RBC 4.65 11/19/2016    HGB 14.0 11/19/2016    HCT 43.2 11/19/2016    MCV 92.9 11/19/2016    MCH 30.1 11/19/2016    MCHC 32.4 11/19/2016    RDW 13.8 11/19/2016     11/19/2016    MPV 9.7 11/19/2016     Lab Results   Component Value Date    TSH 1.26 12/28/2019     Lab Results   Component Value Date    CHOL 174 12/28/2019    HDL 55 12/28/2019       Assessment/Plan:      Diagnosis Orders   1. Dysthymia     2. Acute posttraumatic stress disorder       Continue current medications. We will recheck again in 3 months. Sooner if any difficulties. 1.  Jonathon Au received counseling on the following healthy behaviors: nutrition, exercise and medication adherence  2. Patient given educational materials - see patient instructions  3. Was a self-tracking handout given in paper form or via [employer]? No  If yes, see orders or list here. 4.  Discussed use, benefit, and side effects of prescribed medications. Barriers to medication compliance addressed. All patient questions answered. Pt voiced understanding. 5.  Reviewed prior labs and health maintenance  6. Continue current medications, diet and exercise. Completed Refills   Requested Prescriptions      No prescriptions requested or ordered in this encounter         Return in about 6 months (around 9/10/2020) for check up.

## 2020-05-21 RX ORDER — ARIPIPRAZOLE 5 MG/1
5 TABLET ORAL DAILY
Qty: 90 TABLET | Refills: 3 | Status: SHIPPED | OUTPATIENT
Start: 2020-05-21 | End: 2020-06-29 | Stop reason: SINTOL

## 2020-05-21 RX ORDER — LEVOTHYROXINE SODIUM 0.05 MG/1
50 TABLET ORAL DAILY
Qty: 90 TABLET | Refills: 3 | Status: SHIPPED | OUTPATIENT
Start: 2020-05-21 | End: 2021-05-10 | Stop reason: SDUPTHER

## 2020-06-08 ENCOUNTER — TELEPHONE (OUTPATIENT)
Dept: PRIMARY CARE CLINIC | Age: 50
End: 2020-06-08

## 2020-06-08 RX ORDER — BUSPIRONE HYDROCHLORIDE 5 MG/1
5 TABLET ORAL 2 TIMES DAILY
Qty: 60 TABLET | Refills: 0 | Status: SHIPPED | OUTPATIENT
Start: 2020-06-08 | End: 2020-06-11 | Stop reason: ALTCHOICE

## 2020-06-11 ENCOUNTER — TELEPHONE (OUTPATIENT)
Dept: PRIMARY CARE CLINIC | Age: 50
End: 2020-06-11

## 2020-06-29 ENCOUNTER — OFFICE VISIT (OUTPATIENT)
Dept: PRIMARY CARE CLINIC | Age: 50
End: 2020-06-29
Payer: COMMERCIAL

## 2020-06-29 VITALS
RESPIRATION RATE: 16 BRPM | WEIGHT: 188.9 LBS | BODY MASS INDEX: 28.63 KG/M2 | SYSTOLIC BLOOD PRESSURE: 117 MMHG | DIASTOLIC BLOOD PRESSURE: 77 MMHG | HEIGHT: 68 IN | HEART RATE: 97 BPM | TEMPERATURE: 97.6 F

## 2020-06-29 PROCEDURE — 99214 OFFICE O/P EST MOD 30 MIN: CPT | Performed by: NURSE PRACTITIONER

## 2020-06-29 ASSESSMENT — ENCOUNTER SYMPTOMS
DIARRHEA: 0
NAUSEA: 0
WHEEZING: 0
RHINORRHEA: 0
SORE THROAT: 0
CONSTIPATION: 0
SHORTNESS OF BREATH: 0
COUGH: 0
VISUAL CHANGE: 0
VOMITING: 0
ABDOMINAL PAIN: 1

## 2020-07-06 RX ORDER — ALPRAZOLAM 0.5 MG/1
0.5 TABLET ORAL 3 TIMES DAILY PRN
Qty: 90 TABLET | Refills: 0 | Status: SHIPPED | OUTPATIENT
Start: 2020-07-06 | End: 2020-09-21 | Stop reason: SDUPTHER

## 2020-07-06 NOTE — TELEPHONE ENCOUNTER
Controlled Substance Monitoring:    Acute and Chronic Pain Monitoring:   RX Monitoring 7/6/2020   Periodic Controlled Substance Monitoring No signs of potential drug abuse or diversion identified.

## 2020-07-06 NOTE — TELEPHONE ENCOUNTER
Health Maintenance   Topic Date Due    DTaP/Tdap/Td vaccine (1 - Tdap) 08/28/2020 (Originally 6/23/1988)    Cervical cancer screen  08/28/2020 (Originally 6/23/1990)    Shingles Vaccine (1 of 2) 08/28/2020 (Originally 6/23/2019)    HIV screen  08/28/2020 (Originally 6/23/1984)    Pneumococcal 0-64 years Vaccine (1 of 1 - PPSV23) 10/15/2020 (Originally 6/23/1975)    Flu vaccine (1) 09/01/2020    Breast cancer screen  10/09/2020    Colon Cancer Screen FIT/FOBT  10/21/2020    Lipid screen  12/28/2020    Diabetes screen  08/31/2022    Hepatitis A vaccine  Aged Out    Hepatitis B vaccine  Aged Out    Hib vaccine  Aged Out    Meningococcal (ACWY) vaccine  Aged Out             (applicable per patient's age: Cancer Screenings, Depression Screening, Fall Risk Screening, Immunizations)    LDL Cholesterol (mg/dL)   Date Value   12/28/2019 96     AST (U/L)   Date Value   12/22/2017 12     ALT (U/L)   Date Value   12/22/2017 13     BUN (mg/dL)   Date Value   11/19/2016 13      (goal A1C is < 7)   (goal LDL is <100) need 30-50% reduction from baseline     BP Readings from Last 3 Encounters:   06/29/20 117/77   03/10/20 124/84   02/11/20 (!) 126/90    (goal /80)      All Future Testing planned in [facility]:      Next Visit Date:  Future Appointments   Date Time Provider Buzz Garcia   9/10/2020 11:00 AM Evangelina Gonzalez, APRN - CNP [facility]            There is no problem list on file for this patient.

## 2020-07-21 ENCOUNTER — OFFICE VISIT (OUTPATIENT)
Dept: PRIMARY CARE CLINIC | Age: 50
End: 2020-07-21
Payer: COMMERCIAL

## 2020-07-21 VITALS
WEIGHT: 183.4 LBS | HEIGHT: 69 IN | DIASTOLIC BLOOD PRESSURE: 69 MMHG | TEMPERATURE: 98.9 F | BODY MASS INDEX: 27.16 KG/M2 | HEART RATE: 87 BPM | RESPIRATION RATE: 16 BRPM | SYSTOLIC BLOOD PRESSURE: 111 MMHG

## 2020-07-21 PROCEDURE — 99214 OFFICE O/P EST MOD 30 MIN: CPT | Performed by: NURSE PRACTITIONER

## 2020-07-21 ASSESSMENT — ENCOUNTER SYMPTOMS
VISUAL CHANGE: 0
RHINORRHEA: 0
COUGH: 0
SORE THROAT: 0
WHEEZING: 0
DIARRHEA: 0
VOMITING: 0
CONSTIPATION: 0
SHORTNESS OF BREATH: 0
ABDOMINAL PAIN: 1
NAUSEA: 0

## 2020-07-21 NOTE — PROGRESS NOTES
Name: Randolph Cuenca  : 1970         Chief Complaint:     Chief Complaint   Patient presents with   [address] Problem     \"I'm feeling more paranoid and anxious. \"  C/o \"no appetite. \"        History of Present Illness:      Randolph Cuenca is a 45 y.o.  female who presents with Mental Health Problem (\"I'm feeling more paranoid and anxious. \"  C/o \"no appetite. \" )      Niko Stubbs is here today for a routine office visit. PTSD-her stepfather was involved in an accident where he caused a fatality. She states she is very concerned about her stepfather at this time as he is facing an impending investigation. Patient states she is unable to sleep and has had constant thought about the death of the involved party. UPDATE 03/10/2020-greatly improved with use of increased dose of Pristiq. Patient only using alprazolam infrequently if at all. Update 2020- patient has stopped using BuSpar due to ill side effects. Patient states her anxiety is controlled with alprazolam at night but she feels down all the time. Patient states her father's trial for vehicular manslaughter is coming up and she is unable to concentrate on anything else. Patient feels despair and is concerned for her father's future. Update 2020- little to no improvement with the addition of lurasidone. Patient states she feels that she is becoming paranoid. Patient states when she was driving the other day she felt she had to stop and go back and check on a child that was riding a bike to make sure that he was unharmed when she drove past him. We did explore posttraumatic stress and I believe she is suffering from a fair amount of this. She states her father was sentenced for vehicular manslaughter last week and this has been weighing on her mind. Mental Health Problem   The primary symptoms include dysphoric mood.  The primary symptoms do not include delusions, hallucinations, bizarre behavior, disorganized speech, negative hallucinations, self-injury and suicidal ideas. The patient is nervous/anxious and has insomnia. The patient is not hyperactive. Physical Exam:   Vitals:  /69 (Site: Left Upper Arm, Position: Sitting, Cuff Size: Large Adult)   Pulse 87   Temp 98.9 °F (37.2 °C) (Temporal)   Resp 16   Ht 5' 8.5\" (1.74 m)   Wt 183 lb 6.4 oz (83.2 kg)   BMI 27.48 kg/m²     Physical Exam  Vitals signs and nursing note reviewed. Constitutional:       General: She is not in acute distress. Appearance: Normal appearance. HENT:      Mouth/Throat:      Mouth: Mucous membranes are moist.   Eyes:      General: No scleral icterus. Conjunctiva/sclera: Conjunctivae normal.   Neck:      Musculoskeletal: Normal range of motion and neck supple. Cardiovascular:      Rate and Rhythm: Normal rate and regular rhythm. Heart sounds: No murmur. Pulmonary:      Effort: Pulmonary effort is normal.      Breath sounds: Normal breath sounds. Abdominal:      General: Bowel sounds are normal. There is no distension. Palpations: Abdomen is soft. Tenderness: There is no abdominal tenderness. Musculoskeletal:      Right lower leg: No edema. Left lower leg: No edema. Skin:     General: Skin is warm and dry. Findings: No rash. Neurological:      Mental Status: She is alert and oriented to person, place, and time. Psychiatric:         Attention and Perception: Attention normal.         Mood and Affect: Mood is anxious and depressed. Affect is tearful. Speech: Speech normal.         Behavior: Behavior normal. Behavior is cooperative. Thought Content: Thought content normal. Thought content does not include homicidal or suicidal ideation. Thought content does not include homicidal or suicidal plan.          Cognition and Memory: Cognition normal.         Judgment: Judgment normal.      Comments: Tearful at times         Data:     Lab Results   Component Value Date     11/19/2016

## 2020-07-21 NOTE — PATIENT INSTRUCTIONS
SURVEY:    You may be receiving a survey from [employer] regarding your visit today. Please complete the survey to enable us to provide the highest quality of care to you and your family. If you cannot score us a very good on any question, please call the office to discuss how we could have made your experience a very good one. Thank you. Patient Education        Anxiety Disorder: Care Instructions  Your Care Instructions     Anxiety is a normal reaction to stress. Difficult situations can cause you to have symptoms such as sweaty palms and a nervous feeling. In an anxiety disorder, the symptoms are far more severe. Constant worry, muscle tension, trouble sleeping, nausea and diarrhea, and other symptoms can make normal daily activities difficult or impossible. These symptoms may occur for no reason, and they can affect your work, school, or social life. Medicines, counseling, and self-care can all help. Follow-up care is a key part of your treatment and safety. Be sure to make and go to all appointments, and call your doctor if you are having problems. It's also a good idea to know your test results and keep a list of the medicines you take. How can you care for yourself at home? · Take medicines exactly as directed. Call your doctor if you think you are having a problem with your medicine. · Go to your counseling sessions and follow-up appointments. · Recognize and accept your anxiety. Then, when you are in a situation that makes you anxious, say to yourself, \"This is not an emergency. I feel uncomfortable, but I am not in danger. I can keep going even if I feel anxious. \"  · Be kind to your body:  ? Relieve tension with exercise or a massage. ? Get enough rest.  ? Avoid alcohol, caffeine, nicotine, and illegal drugs. They can increase your anxiety level and cause sleep problems. ? Learn and do relaxation techniques. See below for more about these techniques. · Engage your mind.  Get out and do something you enjoy. Go to a funny movie, or take a walk or hike. Plan your day. Having too much or too little to do can make you anxious. · Keep a record of your symptoms. Discuss your fears with a good friend or family member, or join a support group for people with similar problems. Talking to others sometimes relieves stress. · Get involved in social groups, or volunteer to help others. Being alone sometimes makes things seem worse than they are. · Get at least 30 minutes of exercise on most days of the week to relieve stress. Walking is a good choice. You also may want to do other activities, such as running, swimming, cycling, or playing tennis or team sports. Relaxation techniques  Do relaxation exercises 10 to 20 minutes a day. You can play soothing, relaxing music while you do them, if you wish. · Tell others in your house that you are going to do your relaxation exercises. Ask them not to disturb you. · Find a comfortable place, away from all distractions and noise. · Lie down on your back, or sit with your back straight. · Focus on your breathing. Make it slow and steady. · Breathe in through your nose. Breathe out through either your nose or mouth. · Breathe deeply, filling up the area between your navel and your rib cage. Breathe so that your belly goes up and down. · Do not hold your breath. · Breathe like this for 5 to 10 minutes. Notice the feeling of calmness throughout your whole body. As you continue to breathe slowly and deeply, relax by doing the following for another 5 to 10 minutes:  · Tighten and relax each muscle group in your body. You can begin at your toes and work your way up to your head. · Imagine your muscle groups relaxing and becoming heavy. · Empty your mind of all thoughts. · Let yourself relax more and more deeply. · Become aware of the state of calmness that surrounds you.   · When your relaxation time is over, you can bring yourself back to alertness by moving your fingers and toes and then your hands and feet and then stretching and moving your entire body. Sometimes people fall asleep during relaxation, but they usually wake up shortly afterward. · Always give yourself time to return to full alertness before you drive a car or do anything that might cause an accident if you are not fully alert. Never play a relaxation tape while you drive a car. When should you call for help? [id number] anytime you think you may need emergency care. For example, call if:  · You feel you cannot stop from hurting yourself or someone else. Keep the numbers for these national suicide hotlines: 8-818-496-TALK (8-979.631.5970) and 2-553-RPVHAHN (7-367.884.7845). If you or someone you know talks about suicide or feeling hopeless, get help right away. Watch closely for changes in your health, and be sure to contact your doctor if:  · You have anxiety or fear that affects your life. · You have symptoms of anxiety that are new or different from those you had before. Where can you learn more? Go to [email or web address] and sign in to your [employer] account. Enter P754 in the Meet[employer] box to learn more about \"Anxiety Disorder: Care Instructions. \"     If you do not have an account, please click on the \"Sign Up Now\" link. Current as of: January 31, 2020               Content Version: 12.5  © 4192-9319 Healthwise, Incorporated. Care instructions adapted under license by [facility] ([facility]). If you have questions about a medical condition or this instruction, always ask your healthcare professional. [address] any warranty or liability for your use of this information.

## 2020-08-06 RX ORDER — DESVENLAFAXINE 100 MG/1
TABLET, EXTENDED RELEASE ORAL
Qty: 90 TABLET | Refills: 1 | Status: SHIPPED | OUTPATIENT
Start: 2020-08-06 | End: 2021-01-25 | Stop reason: SDUPTHER

## 2020-08-06 NOTE — TELEPHONE ENCOUNTER
Health Maintenance   Topic Date Due    DTaP/Tdap/Td vaccine (1 - Tdap) 08/28/2020 (Originally 6/23/1988)    Cervical cancer screen  08/28/2020 (Originally 6/23/1990)    Shingles Vaccine (1 of 2) 08/28/2020 (Originally 6/23/2019)    HIV screen  08/28/2020 (Originally 6/23/1984)    Pneumococcal 0-64 years Vaccine (1 of 1 - PPSV23) 10/15/2020 (Originally 6/23/1975)    Flu vaccine (1) 09/01/2020    Breast cancer screen  10/09/2020    Colon Cancer Screen FIT/FOBT  10/21/2020    Lipid screen  12/28/2020    Diabetes screen  08/31/2022    Hepatitis A vaccine  Aged Out    Hepatitis B vaccine  Aged Out    Hib vaccine  Aged Out    Meningococcal (ACWY) vaccine  Aged Out             (applicable per patient's age: Cancer Screenings, Depression Screening, Fall Risk Screening, Immunizations)    LDL Cholesterol (mg/dL)   Date Value   12/28/2019 96     AST (U/L)   Date Value   12/22/2017 12     ALT (U/L)   Date Value   12/22/2017 13     BUN (mg/dL)   Date Value   11/19/2016 13      (goal A1C is < 7)   (goal LDL is <100) need 30-50% reduction from baseline     BP Readings from Last 3 Encounters:   07/21/20 111/69   06/29/20 117/77   03/10/20 124/84    (goal /80)      All Future Testing planned in [facility]:      Next Visit Date:  Future Appointments   Date Time Provider Buzz Garcia   8/12/2020  2:20 PM LUIS Aranda CNP [facility]   9/10/2020 11:00 AM LUIS Aranda CNP [facility]            There is no problem list on file for this patient.

## 2020-08-12 ENCOUNTER — OFFICE VISIT (OUTPATIENT)
Dept: PRIMARY CARE CLINIC | Age: 50
End: 2020-08-12
Payer: COMMERCIAL

## 2020-08-12 VITALS
BODY MASS INDEX: 26.73 KG/M2 | HEART RATE: 80 BPM | SYSTOLIC BLOOD PRESSURE: 120 MMHG | DIASTOLIC BLOOD PRESSURE: 67 MMHG | HEIGHT: 69 IN | RESPIRATION RATE: 16 BRPM | TEMPERATURE: 97.6 F | WEIGHT: 180.5 LBS

## 2020-08-12 PROCEDURE — 99214 OFFICE O/P EST MOD 30 MIN: CPT | Performed by: NURSE PRACTITIONER

## 2020-08-12 ASSESSMENT — ENCOUNTER SYMPTOMS
NAUSEA: 0
COUGH: 0
CONSTIPATION: 0
SORE THROAT: 0
VISUAL CHANGE: 0
DIARRHEA: 0
RHINORRHEA: 0
VOMITING: 0
WHEEZING: 0
ABDOMINAL PAIN: 0
SHORTNESS OF BREATH: 0

## 2020-08-12 NOTE — PROGRESS NOTES
Name: Jose A Mcfadden  : 1970         Chief Complaint:     Chief Complaint   Patient presents with    Mental Health Problem     \"The medicationn is helping and made a big difference. \"        History of Present Illness:      Jose A Mcfadden is a 45 y.o.  female who presents with Mental Health Problem (\"The medicationn is helping and made a big difference. \" )      Jessica Bill is here today for a routine office visit. PTSD-her stepfather was involved in an accident where he caused a fatality. She states she is very concerned about her stepfather at this time as he is facing an impending investigation. Patient states she is unable to sleep and has had constant thought about the death of the involved party. UPDATE 03/10/2020-greatly improved with use of increased dose of Pristiq. Patient only using alprazolam infrequently if at all. Update 2020- patient has stopped using BuSpar due to ill side effects. Patient states her anxiety is controlled with alprazolam at night but she feels down all the time. Patient states her father's trial for vehicular manslaughter is coming up and she is unable to concentrate on anything else. Patient feels despair and is concerned for her father's future. Update 2020- little to no improvement with the addition of lurasidone. Patient states she feels that she is becoming paranoid. Patient states when she was driving the other day she felt she had to stop and go back and check on a child that was riding a bike to make sure that he was unharmed when she drove past him. We did explore posttraumatic stress and I believe she is suffering from a fair amount of this. She states her father was sentenced for vehicular manslaughter last week and this has been weighing on her mind.     UPDATE 2020- MUCH IMPROVED WITH LATUDA AND PRISTIQ, STATES HAVING MORE GOOD THAN BAD DAYS,ABLE TO COMPLETE HOUSEWORK AND ENJOY MORE THINGS,  SEE BELOW FOR FURTHER DETAIL    Mental Health Problem   The primary symptoms include dysphoric mood (IMPROVED). The primary symptoms do not include delusions, hallucinations, bizarre behavior, disorganized speech, negative symptoms or somatic symptoms. The current episode started more than 1 month ago. This is a new problem. The onset of the illness is precipitated by a stressful event. The degree of incapacity that she is experiencing as a consequence of her illness is moderate. Sequelae of the illness include harmed interpersonal relations. Additional symptoms of the illness include insomnia (IMPROVED) and fatigue (IMPROVED). Additional symptoms of the illness do not include anhedonia, hypersomnia, appetite change, unexpected weight change, agitation, psychomotor retardation, feelings of worthlessness, attention impairment, euphoric mood, increased goal-directed activity, flight of ideas, inflated self-esteem, decreased need for sleep, distractible, poor judgment, visual change, headaches, abdominal pain or seizures. She does not admit to suicidal ideas. She does not have a plan to attempt suicide. She does not contemplate harming herself. She has not already injured self. She does not contemplate injuring another person. She has not already  injured another person. Risk factors that are present for mental illness include a history of mental illness. Past Medical History:     Past Medical History:   Diagnosis Date    Anxiety     Depression     Fibroadenoma of left breast     Hyperlipidemia     Multinodular thyroid       Reviewed all health maintenance requirements and ordered appropriate tests  There are no preventive care reminders to display for this patient. Past Surgical History:     Past Surgical History:   Procedure Laterality Date    EYE SURGERY      age 9    TUBAL LIGATION          Medications:       Prior to Admission medications    Medication Sig Start Date End Date Taking?  Authorizing Provider   lurasidone (LATUDA) 80 MG TABS tablet Take 1 tablet by mouth daily 8/12/20  Yes LUIS Greenwood - CNP   desvenlafaxine succinate (PRISTIQ) 100 MG TB24 extended release tablet TAKE 1 TABLET BY MOUTH ONCE DAILY 8/6/20  Yes Tamela Pranav ELVA NikkileonidasLUIS - CNP   levothyroxine (SYNTHROID) 50 MCG tablet Take 1 tablet by mouth Daily 5/21/20  Yes LUIS Greenwood CNP   atorvastatin (LIPITOR) 40 MG tablet Take 1 tablet by mouth daily 3/3/20  Yes LUIS Greenwood - CNP   loratadine (CLARITIN) 10 MG tablet Take 10 mg by mouth daily   Yes Historical Provider, MD   fluticasone (FLONASE) 50 MCG/ACT nasal spray 1 spray by Each Nostril route daily   Yes Historical Provider, MD        Allergies:       Pcn [penicillins]; Clindamycin hcl; and Panlor [apap-caff-dihydrocodeine]    Social History:     Tobacco:    reports that she has been smoking. She has a 30.00 pack-year smoking history. She has never used smokeless tobacco.  Alcohol:      reports current alcohol use. Drug Use:  reports no history of drug use. Family History:     Family History   Problem Relation Age of Onset   Sean Mauricio ADHD Mother     Cancer Father        Review of Systems:     Positive and Negative as described in HPI    Review of Systems   Constitutional: Positive for fatigue (IMPROVED). Negative for appetite change, chills, fever and unexpected weight change. HENT: Negative for congestion, rhinorrhea and sore throat. Eyes: Negative for visual disturbance. Respiratory: Negative for cough, shortness of breath and wheezing. Cardiovascular: Negative for chest pain and palpitations. Gastrointestinal: Negative for abdominal pain, constipation, diarrhea, nausea and vomiting. Genitourinary: Negative for difficulty urinating and dysuria. Musculoskeletal: Negative for gait problem, neck pain and neck stiffness. Skin: Negative for rash. Neurological: Negative for dizziness, seizures, syncope, light-headedness and headaches.    Psychiatric/Behavioral: Positive for decreased concentration (IMPROVED) and dysphoric mood (IMPROVED). Negative for agitation, behavioral problems, confusion, hallucinations, self-injury, sleep disturbance and suicidal ideas. The patient is nervous/anxious (IMPROVED) and has insomnia (IMPROVED). The patient is not hyperactive. Physical Exam:   Vitals:  /67 (Site: Left Upper Arm, Position: Sitting, Cuff Size: Medium Adult)   Pulse 80   Temp 97.6 °F (36.4 °C) (Temporal)   Resp 16   Ht 5' 8.5\" (1.74 m)   Wt 180 lb 8 oz (81.9 kg)   BMI 27.05 kg/m²     Physical Exam  Vitals signs and nursing note reviewed. Constitutional:       General: She is not in acute distress. Appearance: Normal appearance. HENT:      Mouth/Throat:      Mouth: Mucous membranes are moist.   Eyes:      General: No scleral icterus. Conjunctiva/sclera: Conjunctivae normal.   Neck:      Musculoskeletal: Normal range of motion and neck supple. Cardiovascular:      Rate and Rhythm: Normal rate and regular rhythm. Heart sounds: No murmur. Pulmonary:      Effort: Pulmonary effort is normal.      Breath sounds: Normal breath sounds. Abdominal:      General: Bowel sounds are normal. There is no distension. Palpations: Abdomen is soft. Tenderness: There is no abdominal tenderness. Musculoskeletal:      Right lower leg: No edema. Left lower leg: No edema. Skin:     General: Skin is warm and dry. Findings: No rash. Neurological:      Mental Status: She is alert and oriented to person, place, and time. Psychiatric:         Attention and Perception: Attention and perception normal.         Mood and Affect: Mood and affect normal. Mood is not anxious or depressed. Affect is not tearful. Speech: Speech normal.         Behavior: Behavior normal. Behavior is cooperative. Thought Content: Thought content normal. Thought content does not include homicidal or suicidal ideation.  Thought content does not include homicidal or suicidal plan.         Cognition and Memory: Cognition and memory normal.         Judgment: Judgment normal.      Comments: Cheerful and smiling         Data:     Lab Results   Component Value Date     11/19/2016    K 4.3 11/19/2016     11/19/2016    CO2 21 11/19/2016    BUN 13 11/19/2016    CREATININE 0.77 11/19/2016    GLUCOSE 89 11/19/2016    PROT 7.3 11/19/2016    LABALBU 4.3 11/19/2016    BILITOT 0.33 11/19/2016    ALKPHOS 65 11/19/2016    AST 12 12/22/2017    ALT 13 12/22/2017     Lab Results   Component Value Date    WBC 9.6 11/19/2016    RBC 4.65 11/19/2016    HGB 14.0 11/19/2016    HCT 43.2 11/19/2016    MCV 92.9 11/19/2016    MCH 30.1 11/19/2016    MCHC 32.4 11/19/2016    RDW 13.8 11/19/2016     11/19/2016    MPV 9.7 11/19/2016     Lab Results   Component Value Date    TSH 1.26 12/28/2019     Lab Results   Component Value Date    CHOL 174 12/28/2019    HDL 55 12/28/2019       Assessment/Plan:      Diagnosis Orders   1. Dysthymia  lurasidone (LATUDA) 80 MG TABS tablet   2. PTSD (post-traumatic stress disorder)  lurasidone (LATUDA) 80 MG TABS tablet     Increase Latuda to 80 mg daily, call if problems, continue Pristiq. Will follow up in 3 months or sooner      1. Shaneka Majors received counseling on the following healthy behaviors: nutrition, exercise and medication adherence  2. Patient given educational materials - see patient instructions  3. Was a self-tracking handout given in paper form or via [employer]? No  If yes, see orders or list here. 4.  Discussed use, benefit, and side effects of prescribed medications. Barriers to medication compliance addressed. All patient questions answered. Pt voiced understanding. 5.  Reviewed prior labs and health maintenance  6. Continue current medications, diet and exercise.     Completed Refills   Requested Prescriptions     Signed Prescriptions Disp Refills    lurasidone (LATUDA) 80 MG TABS tablet 90 tablet 1     Sig: Take 1 tablet by mouth daily Return in about 3 months (around 11/12/2020) for Check up.

## 2020-08-12 NOTE — PATIENT INSTRUCTIONS
SURVEY:    You may be receiving a survey from [employer] regarding your visit today. Please complete the survey to enable us to provide the highest quality of care to you and your family. If you cannot score us a very good on any question, please call the office to discuss how we could have made your experience a very good one. Thank you. Patient Education        Anxiety Disorder: Care Instructions  Your Care Instructions     Anxiety is a normal reaction to stress. Difficult situations can cause you to have symptoms such as sweaty palms and a nervous feeling. In an anxiety disorder, the symptoms are far more severe. Constant worry, muscle tension, trouble sleeping, nausea and diarrhea, and other symptoms can make normal daily activities difficult or impossible. These symptoms may occur for no reason, and they can affect your work, school, or social life. Medicines, counseling, and self-care can all help. Follow-up care is a key part of your treatment and safety. Be sure to make and go to all appointments, and call your doctor if you are having problems. It's also a good idea to know your test results and keep a list of the medicines you take. How can you care for yourself at home? · Take medicines exactly as directed. Call your doctor if you think you are having a problem with your medicine. · Go to your counseling sessions and follow-up appointments. · Recognize and accept your anxiety. Then, when you are in a situation that makes you anxious, say to yourself, \"This is not an emergency. I feel uncomfortable, but I am not in danger. I can keep going even if I feel anxious. \"  · Be kind to your body:  ? Relieve tension with exercise or a massage. ? Get enough rest.  ? Avoid alcohol, caffeine, nicotine, and illegal drugs. They can increase your anxiety level and cause sleep problems. ? Learn and do relaxation techniques. See below for more about these techniques. · Engage your mind.  Get out and do something you enjoy. Go to a funny movie, or take a walk or hike. Plan your day. Having too much or too little to do can make you anxious. · Keep a record of your symptoms. Discuss your fears with a good friend or family member, or join a support group for people with similar problems. Talking to others sometimes relieves stress. · Get involved in social groups, or volunteer to help others. Being alone sometimes makes things seem worse than they are. · Get at least 30 minutes of exercise on most days of the week to relieve stress. Walking is a good choice. You also may want to do other activities, such as running, swimming, cycling, or playing tennis or team sports. Relaxation techniques  Do relaxation exercises 10 to 20 minutes a day. You can play soothing, relaxing music while you do them, if you wish. · Tell others in your house that you are going to do your relaxation exercises. Ask them not to disturb you. · Find a comfortable place, away from all distractions and noise. · Lie down on your back, or sit with your back straight. · Focus on your breathing. Make it slow and steady. · Breathe in through your nose. Breathe out through either your nose or mouth. · Breathe deeply, filling up the area between your navel and your rib cage. Breathe so that your belly goes up and down. · Do not hold your breath. · Breathe like this for 5 to 10 minutes. Notice the feeling of calmness throughout your whole body. As you continue to breathe slowly and deeply, relax by doing the following for another 5 to 10 minutes:  · Tighten and relax each muscle group in your body. You can begin at your toes and work your way up to your head. · Imagine your muscle groups relaxing and becoming heavy. · Empty your mind of all thoughts. · Let yourself relax more and more deeply. · Become aware of the state of calmness that surrounds you.   · When your relaxation time is over, you can bring yourself back to alertness by moving your fingers and toes and then your hands and feet and then stretching and moving your entire body. Sometimes people fall asleep during relaxation, but they usually wake up shortly afterward. · Always give yourself time to return to full alertness before you drive a car or do anything that might cause an accident if you are not fully alert. Never play a relaxation tape while you drive a car. When should you call for help? [id number] anytime you think you may need emergency care. For example, call if:  · You feel you cannot stop from hurting yourself or someone else. Keep the numbers for these national suicide hotlines: 6-125-750-TALK (3-891.404.3354) and 4-821-MPGGBGU (7-688.212.2862). If you or someone you know talks about suicide or feeling hopeless, get help right away. Watch closely for changes in your health, and be sure to contact your doctor if:  · You have anxiety or fear that affects your life. · You have symptoms of anxiety that are new or different from those you had before. Where can you learn more? Go to [email or web address]://[employer]. org and sign in to your [employer] account. Enter P754 in the [employer] box to learn more about \"Anxiety Disorder: Care Instructions. \"     If you do not have an account, please click on the \"Sign Up Now\" link. Current as of: January 31, 2020               Content Version: 12.5  © 1460-1907 Healthwise, Incorporated. Care instructions adapted under license by [facility] ([facility]). If you have questions about a medical condition or this instruction, always ask your healthcare professional. [address] any warranty or liability for your use of this information.

## 2020-09-21 RX ORDER — ALPRAZOLAM 0.5 MG/1
0.5 TABLET ORAL 2 TIMES DAILY PRN
Qty: 60 TABLET | Refills: 0 | Status: SHIPPED | OUTPATIENT
Start: 2020-09-21 | End: 2020-10-21 | Stop reason: SDUPTHER

## 2020-09-21 NOTE — TELEPHONE ENCOUNTER
Controlled Substance Monitoring:    Acute and Chronic Pain Monitoring:   RX Monitoring 9/21/2020   Periodic Controlled Substance Monitoring No signs of potential drug abuse or diversion identified.

## 2020-09-21 NOTE — TELEPHONE ENCOUNTER
Health Maintenance   Topic Date Due    HIV screen  06/23/1984    DTaP/Tdap/Td vaccine (1 - Tdap) 06/23/1988    Cervical cancer screen  06/23/1990    Shingles Vaccine (1 of 2) 06/23/2019    Flu vaccine (1) 09/01/2020    Breast cancer screen  10/09/2020    Colon Cancer Screen FIT/FOBT  10/21/2020    Pneumococcal 0-64 years Vaccine (1 of 1 - PPSV23) 10/15/2020 (Originally 6/23/1975)    Lipid screen  12/28/2020    Diabetes screen  08/31/2022    Hepatitis A vaccine  Aged Out    Hepatitis B vaccine  Aged Out    Hib vaccine  Aged Out    Meningococcal (ACWY) vaccine  Aged Out             (applicable per patient's age: Cancer Screenings, Depression Screening, Fall Risk Screening, Immunizations)    LDL Cholesterol (mg/dL)   Date Value   12/28/2019 96     AST (U/L)   Date Value   12/22/2017 12     ALT (U/L)   Date Value   12/22/2017 13     BUN (mg/dL)   Date Value   11/19/2016 13      (goal A1C is < 7)   (goal LDL is <100) need 30-50% reduction from baseline     BP Readings from Last 3 Encounters:   08/12/20 120/67   07/21/20 111/69   06/29/20 117/77    (goal /80)      All Future Testing planned in CarePATH:      Next Visit Date:  Future Appointments   Date Time Provider Buzz Garcia   11/10/2020  2:40 PM Evangelina Gonzalez, APRN - CNP [facility]            There is no problem list on file for this patient.

## 2020-09-28 ENCOUNTER — TELEPHONE (OUTPATIENT)
Dept: PRIMARY CARE CLINIC | Age: 50
End: 2020-09-28

## 2020-09-28 NOTE — TELEPHONE ENCOUNTER
Called patient and informed her that Malcolm Abreu increased her Latuda to 120mg daily and make take 1-1/2 of current tablets. Informed that new RX was sent to the pharmacy. Verbalizes understanding.

## 2020-09-28 NOTE — TELEPHONE ENCOUNTER
Latuda dose increased to 120 mg daily. May take 1-1/2 of current tablets. New Rx also sent to pharmacy.   Thank you

## 2020-09-28 NOTE — TELEPHONE ENCOUNTER
Phone call from patient stating that her [address] doesn't feel like it is working as well as it did. Would like to know if the dose could be increased. Health Maintenance   Topic Date Due    HIV screen  06/23/1984    DTaP/Tdap/Td vaccine (1 - Tdap) 06/23/1988    Cervical cancer screen  06/23/1990    Shingles Vaccine (1 of 2) 06/23/2019    Flu vaccine (1) 09/01/2020    Breast cancer screen  10/09/2020    Colon Cancer Screen FIT/FOBT  10/21/2020    Pneumococcal 0-64 years Vaccine (1 of 1 - PPSV23) 10/15/2020 (Originally 6/23/1975)    Lipid screen  12/28/2020    Diabetes screen  08/31/2022    Hepatitis A vaccine  Aged Out    Hepatitis B vaccine  Aged Out    Hib vaccine  Aged Out    Meningococcal (ACWY) vaccine  Aged Out             (applicable per patient's age: Cancer Screenings, Depression Screening, Fall Risk Screening, Immunizations)    LDL Cholesterol (mg/dL)   Date Value   12/28/2019 96     AST (U/L)   Date Value   12/22/2017 12     ALT (U/L)   Date Value   12/22/2017 13     BUN (mg/dL)   Date Value   11/19/2016 13      (goal A1C is < 7)   (goal LDL is <100) need 30-50% reduction from baseline     BP Readings from Last 3 Encounters:   08/12/20 120/67   07/21/20 111/69   06/29/20 117/77    (goal /80)      All Future Testing planned in CarePATH:      Next Visit Date:  Future Appointments   Date Time Provider Buzz Garcia   11/10/2020  2:40 PM Mil Gonzalez, APRN - CNP [facility]            There is no problem list on file for this patient.

## 2020-10-21 RX ORDER — ALPRAZOLAM 0.5 MG/1
0.5 TABLET ORAL 2 TIMES DAILY PRN
Qty: 30 TABLET | Refills: 0 | Status: SHIPPED | OUTPATIENT
Start: 2020-10-21 | End: 2020-11-09

## 2020-10-21 NOTE — TELEPHONE ENCOUNTER
Pt called requesting refill on Xanax sent to [facility] in [place]. Health Maintenance   Topic Date Due    Pneumococcal 0-64 years Vaccine (1 of 1 - PPSV23) 06/23/1975    HIV screen  06/23/1984    DTaP/Tdap/Td vaccine (1 - Tdap) 06/23/1988    Cervical cancer screen  06/23/1990    Shingles Vaccine (1 of 2) 06/23/2019    Flu vaccine (1) 09/01/2020    Breast cancer screen  10/09/2020    Colon Cancer Screen FIT/FOBT  10/21/2020    Lipid screen  12/28/2020    Diabetes screen  08/31/2022    Hepatitis A vaccine  Aged Out    Hepatitis B vaccine  Aged Out    Hib vaccine  Aged Out    Meningococcal (ACWY) vaccine  Aged Out             (applicable per patient's age: Cancer Screenings, Depression Screening, Fall Risk Screening, Immunizations)    LDL Cholesterol (mg/dL)   Date Value   12/28/2019 96     AST (U/L)   Date Value   12/22/2017 12     ALT (U/L)   Date Value   12/22/2017 13     BUN (mg/dL)   Date Value   11/19/2016 13      (goal A1C is < 7)   (goal LDL is <100) need 30-50% reduction from baseline     BP Readings from Last 3 Encounters:   08/12/20 120/67   07/21/20 111/69   06/29/20 117/77    (goal /80)      All Future Testing planned in CarePATH:      Next Visit Date:  Future Appointments   Date Time Provider Buzz Garcia   11/10/2020  2:40 PM Julian Gonzalez, APRN - CNP [facility]            There is no problem list on file for this patient.

## 2020-11-09 ENCOUNTER — TELEPHONE (OUTPATIENT)
Dept: PRIMARY CARE CLINIC | Age: 50
End: 2020-11-09

## 2020-11-09 RX ORDER — ALPRAZOLAM 0.5 MG/1
0.5 TABLET ORAL 2 TIMES DAILY PRN
Qty: 30 TABLET | Refills: 0 | Status: SHIPPED | OUTPATIENT
Start: 2020-11-09 | End: 2020-11-10 | Stop reason: ALTCHOICE

## 2020-11-09 RX ORDER — ATORVASTATIN CALCIUM 40 MG/1
40 TABLET, FILM COATED ORAL DAILY
Qty: 90 TABLET | Refills: 1 | Status: SHIPPED | OUTPATIENT
Start: 2020-11-09 | End: 2022-09-29

## 2020-11-10 ENCOUNTER — OFFICE VISIT (OUTPATIENT)
Dept: PRIMARY CARE CLINIC | Age: 50
End: 2020-11-10
Payer: COMMERCIAL

## 2020-11-10 VITALS
DIASTOLIC BLOOD PRESSURE: 84 MMHG | WEIGHT: 157.9 LBS | BODY MASS INDEX: 23.93 KG/M2 | SYSTOLIC BLOOD PRESSURE: 122 MMHG | HEART RATE: 84 BPM | HEIGHT: 68 IN | TEMPERATURE: 97.6 F | RESPIRATION RATE: 20 BRPM

## 2020-11-10 PROCEDURE — 99214 OFFICE O/P EST MOD 30 MIN: CPT | Performed by: NURSE PRACTITIONER

## 2020-11-10 RX ORDER — CLONAZEPAM 0.5 MG/1
0.5 TABLET ORAL 2 TIMES DAILY PRN
Qty: 60 TABLET | Refills: 2 | Status: SHIPPED | OUTPATIENT
Start: 2020-11-10 | End: 2021-01-25 | Stop reason: SDUPTHER

## 2020-11-10 ASSESSMENT — ENCOUNTER SYMPTOMS
SHORTNESS OF BREATH: 0
SORE THROAT: 0
DIARRHEA: 0
CONSTIPATION: 0
VISUAL CHANGE: 0
COUGH: 0
NAUSEA: 0
RHINORRHEA: 0
WHEEZING: 0
VOMITING: 0
ABDOMINAL PAIN: 0

## 2020-11-10 ASSESSMENT — PATIENT HEALTH QUESTIONNAIRE - PHQ9
SUM OF ALL RESPONSES TO PHQ9 QUESTIONS 1 & 2: 0
SUM OF ALL RESPONSES TO PHQ QUESTIONS 1-9: 0
2. FEELING DOWN, DEPRESSED OR HOPELESS: 0
1. LITTLE INTEREST OR PLEASURE IN DOING THINGS: 0
SUM OF ALL RESPONSES TO PHQ QUESTIONS 1-9: 0
SUM OF ALL RESPONSES TO PHQ QUESTIONS 1-9: 0

## 2020-11-10 NOTE — PROGRESS NOTES
Name: Jacey Hopper  : 1970         Chief Complaint:     Chief Complaint   Patient presents with   [address] Problem       History of Present Illness:      Jacey Hopper is a 45 y.o.  female who presents with [address] is here today for a routine office visit. Dysthymia-patient states she is been having trouble and it appears she has been taking her Latuda at night. She states this is keeping her awake. She has been using Xanax to sleep. I did discourage her from doing this and state we need to work on a long-term plan. See below for further comment    Mental Health Problem   The primary symptoms do not include dysphoric mood, delusions, hallucinations, bizarre behavior, disorganized speech, negative symptoms or somatic symptoms. The current episode started more than 1 month ago. This is a new problem. The onset of the illness is precipitated by a stressful event. The degree of incapacity that she is experiencing as a consequence of her illness is moderate. Sequelae of the illness include harmed interpersonal relations. Additional symptoms of the illness include insomnia (IMPROVED). Additional symptoms of the illness do not include anhedonia, hypersomnia, appetite change, unexpected weight change, fatigue, agitation, psychomotor retardation, feelings of worthlessness, attention impairment, euphoric mood, increased goal-directed activity, flight of ideas, inflated self-esteem, decreased need for sleep, distractible, poor judgment, visual change, headaches, abdominal pain or seizures. She does not admit to suicidal ideas. She does not have a plan to attempt suicide. She does not contemplate harming herself. She has not already injured self. She does not contemplate injuring another person. She has not already  injured another person. Risk factors that are present for mental illness include a history of mental illness. Hyperlipidemia   This is a chronic problem.  The current episode started more than 1 year ago. The problem is controlled. Recent lipid tests were reviewed and are normal. She has no history of diabetes, hypothyroidism, liver disease or obesity. Factors aggravating her hyperlipidemia include smoking and fatty foods. Pertinent negatives include no chest pain, leg pain or shortness of breath. Current antihyperlipidemic treatment includes statins. The current treatment provides significant improvement of lipids. There are no compliance problems. Risk factors for coronary artery disease include family history, dyslipidemia and stress. Past Medical History:     Past Medical History:   Diagnosis Date    Anxiety     Depression     Fibroadenoma of left breast     Hyperlipidemia     Multinodular thyroid       Reviewed all health maintenance requirements and ordered appropriate tests  Health Maintenance Due   Topic Date Due    Breast cancer screen  10/09/2020    Colon Cancer Screen FIT/FOBT  10/21/2020       Past Surgical History:     Past Surgical History:   Procedure Laterality Date    EYE SURGERY      age 9    TUBAL LIGATION          Medications:       Prior to Admission medications    Medication Sig Start Date End Date Taking? Authorizing Provider   clonazePAM (KLONOPIN) 0.5 MG tablet Take 1 tablet by mouth 2 times daily as needed for Anxiety for up to 90 days.  11/10/20 2/8/21 Yes Lucilla Stade Might, APRN - CNP   atorvastatin (LIPITOR) 40 MG tablet Take 1 tablet by mouth daily 11/9/20  Yes Lucilla Stade Might, APRN - CNP   lurasidone (LATUDA) 120 MG tablet Take 1 tablet by mouth daily 9/28/20  Yes Lucilla Stade Might, APRN - CNP   desvenlafaxine succinate (PRISTIQ) 100 MG TB24 extended release tablet TAKE 1 TABLET BY MOUTH ONCE DAILY 8/6/20  Yes [de-identified] M Deats, APRN - RADHA   levothyroxine (SYNTHROID) 50 MCG tablet Take 1 tablet by mouth Daily 5/21/20  Yes Lucilla Stade Might, APRN - CNP   loratadine (CLARITIN) 10 MG tablet Take 10 mg by mouth daily   Yes Historical Provider, MD distress. Appearance: Normal appearance. HENT:      Mouth/Throat:      Mouth: Mucous membranes are moist.   Eyes:      General: No scleral icterus. Conjunctiva/sclera: Conjunctivae normal.   Neck:      Musculoskeletal: Normal range of motion and neck supple. Cardiovascular:      Rate and Rhythm: Normal rate and regular rhythm. Heart sounds: No murmur. Pulmonary:      Effort: Pulmonary effort is normal.      Breath sounds: Normal breath sounds. Abdominal:      General: Bowel sounds are normal. There is no distension. Palpations: Abdomen is soft. Tenderness: There is no abdominal tenderness. Musculoskeletal:      Right lower leg: No edema. Left lower leg: No edema. Skin:     General: Skin is warm and dry. Findings: No rash. Neurological:      Mental Status: She is alert and oriented to person, place, and time. Psychiatric:         Attention and Perception: Attention and perception normal.         Mood and Affect: Mood and affect normal. Mood is not anxious or depressed. Affect is not tearful. Speech: Speech normal.         Behavior: Behavior normal. Behavior is cooperative. Thought Content: Thought content normal. Thought content does not include homicidal or suicidal ideation. Thought content does not include homicidal or suicidal plan.          Cognition and Memory: Cognition and memory normal.         Judgment: Judgment normal.      Comments: Cheerful and smiling         Data:     Lab Results   Component Value Date     11/19/2016    K 4.3 11/19/2016     11/19/2016    CO2 21 11/19/2016    BUN 13 11/19/2016    CREATININE 0.77 11/19/2016    GLUCOSE 89 11/19/2016    PROT 7.3 11/19/2016    LABALBU 4.3 11/19/2016    BILITOT 0.33 11/19/2016    ALKPHOS 65 11/19/2016    AST 12 12/22/2017    ALT 13 12/22/2017     Lab Results   Component Value Date    WBC 9.6 11/19/2016    RBC 4.65 11/19/2016    HGB 14.0 11/19/2016    HCT 43.2 11/19/2016    MCV 92.9 11/19/2016    MCH 30.1 11/19/2016    MCHC 32.4 11/19/2016    RDW 13.8 11/19/2016     11/19/2016    MPV 9.7 11/19/2016     Lab Results   Component Value Date    TSH 1.26 12/28/2019     Lab Results   Component Value Date    CHOL 174 12/28/2019    HDL 55 12/28/2019       Assessment/Plan:      Diagnosis Orders   1. Dysthymia     2. Nervousness  clonazePAM (KLONOPIN) 0.5 MG tablet   3. Dyslipidemia  ALT    AST    Basic Metabolic Panel    Lipid Panel   4. Other screening mammogram  MOISES MEGHAN DIGITAL DIAGNOSTIC BILATERAL     Stop alprazolam.  Use clonazepam only as needed at night. Continue all the medications. Labs in 6 months. 1.  Gertrudis Carr received counseling on the following healthy behaviors: nutrition, exercise and medication adherence  2. Patient given educational materials - see patient instructions  3. Was a self-tracking handout given in paper form or via [employer]? No  If yes, see orders or list here. 4.  Discussed use, benefit, and side effects of prescribed medications. Barriers to medication compliance addressed. All patient questions answered. Pt voiced understanding. 5.  Reviewed prior labs and health maintenance  6. Continue current medications, diet and exercise. Completed Refills   Requested Prescriptions     Signed Prescriptions Disp Refills    clonazePAM (KLONOPIN) 0.5 MG tablet 60 tablet 2     Sig: Take 1 tablet by mouth 2 times daily as needed for Anxiety for up to 90 days. Return in about 6 months (around 5/10/2021) for Check up.

## 2020-11-12 ENCOUNTER — TELEPHONE (OUTPATIENT)
Dept: PRIMARY CARE CLINIC | Age: 50
End: 2020-11-12

## 2020-11-12 NOTE — TELEPHONE ENCOUNTER
Would like her to try to take the Elyce Lauren in the morning for at least a week or 10 days before deciding. Thank you.

## 2020-11-12 NOTE — TELEPHONE ENCOUNTER
Called patient and left message that Michael Corey would like her to try to take the Melven Reamer in the morning for at least a week or 10 days before deciding to take at night. To call back after 7-10 days and let us know how she is doing. To call office with any other questions.

## 2020-11-12 NOTE — TELEPHONE ENCOUNTER
Phone call from patient stating that she was told to take the Nicol Curling in the morning, she states it makes her feel funny during the day. Would like to know if she could take the Latuda at night along with the Klonopin to help her sleep. Please advise.      Health Maintenance   Topic Date Due    Breast cancer screen  10/09/2020    Colon Cancer Screen FIT/FOBT  10/21/2020    DTaP/Tdap/Td vaccine (1 - Tdap) 11/10/2021 (Originally 6/23/1988)    Cervical cancer screen  11/10/2021 (Originally 6/23/1990)    Flu vaccine (1) 11/10/2021 (Originally 9/1/2020)    Shingles Vaccine (1 of 2) 11/10/2021 (Originally 6/23/2019)    Pneumococcal 0-64 years Vaccine (1 of 1 - PPSV23) 11/10/2021 (Originally 6/23/1975)    HIV screen  11/10/2021 (Originally 6/23/1984)    Lipid screen  12/28/2020    Hepatitis A vaccine  Aged Out    Hepatitis B vaccine  Aged Out    Hib vaccine  Aged Out    Meningococcal (ACWY) vaccine  Aged Out             (applicable per patient's age: Cancer Screenings, Depression Screening, Fall Risk Screening, Immunizations)    LDL Cholesterol (mg/dL)   Date Value   12/28/2019 96     AST (U/L)   Date Value   12/22/2017 12     ALT (U/L)   Date Value   12/22/2017 13     BUN (mg/dL)   Date Value   11/19/2016 13      (goal A1C is < 7)   (goal LDL is <100) need 30-50% reduction from baseline     BP Readings from Last 3 Encounters:   11/10/20 122/84   08/12/20 120/67   07/21/20 111/69    (goal /80)      All Future Testing planned in CarePATH:  Lab Frequency Next Occurrence   MOISES MEGHAN DIGITAL DIAGNOSTIC BILATERAL Once 02/10/2021   ALT Once 05/10/2021   AST Once 39/61/4503   Basic Metabolic Panel Once 71/91/0288   Lipid Panel Once 05/09/2021       Next Visit Date:  Future Appointments   Date Time Provider Buzz Garcia   1/11/2021  4:00 PM [facility] MAMMOGRAPHY ROOM AT [facility] Rad   5/10/2021  2:00 PM Patricia Jason Might, APRN - CNP [facility]            There is no problem list on file for this patient.

## 2020-11-16 ENCOUNTER — TELEPHONE (OUTPATIENT)
Dept: PRIMARY CARE CLINIC | Age: 50
End: 2020-11-16

## 2020-11-16 NOTE — TELEPHONE ENCOUNTER
If she can cut medication in half and still try to take in the morning. If she is unable to cut the medication we will order a lower dose.

## 2020-11-16 NOTE — TELEPHONE ENCOUNTER
Moisés Cornejo called to request to be taken off Leoncio Ontiveros as there are too many side effects. When she takes during the day she feels lethargic & feels she is jumping out of her skin. When she takes at night, she feels she is jumping out of her skin. Can the script be changed or dosage lowered?     Health Maintenance   Topic Date Due    Breast cancer screen  10/09/2020    Colon Cancer Screen FIT/FOBT  10/21/2020    DTaP/Tdap/Td vaccine (1 - Tdap) 11/10/2021 (Originally 6/23/1988)    Cervical cancer screen  11/10/2021 (Originally 6/23/1990)    Flu vaccine (1) 11/10/2021 (Originally 9/1/2020)    Shingles Vaccine (1 of 2) 11/10/2021 (Originally 6/23/2019)    Pneumococcal 0-64 years Vaccine (1 of 1 - PPSV23) 11/10/2021 (Originally 6/23/1975)    HIV screen  11/10/2021 (Originally 6/23/1984)    Lipid screen  12/28/2020    Hepatitis A vaccine  Aged Out    Hepatitis B vaccine  Aged Out    Hib vaccine  Aged Out    Meningococcal (ACWY) vaccine  Aged Out             (applicable per patient's age: Cancer Screenings, Depression Screening, Fall Risk Screening, Immunizations)    LDL Cholesterol (mg/dL)   Date Value   12/28/2019 96     AST (U/L)   Date Value   12/22/2017 12     ALT (U/L)   Date Value   12/22/2017 13     BUN (mg/dL)   Date Value   11/19/2016 13      (goal A1C is < 7)   (goal LDL is <100) need 30-50% reduction from baseline     BP Readings from Last 3 Encounters:   11/10/20 122/84   08/12/20 120/67   07/21/20 111/69    (goal /80)      All Future Testing planned in CarePATH:  Lab Frequency Next Occurrence   MOISES MEGHAN DIGITAL DIAGNOSTIC BILATERAL Once 02/10/2021   ALT Once 05/10/2021   AST Once 02/91/4495   Basic Metabolic Panel Once 83/53/1305   Lipid Panel Once 05/09/2021       Next Visit Date:  Future Appointments   Date Time Provider Buzz Garcia   1/11/2021  4:00 PM [facility]   5/10/2021  2:00 PM LUIS Oropeza - CNP [facility]            There is no problem list on file for this patient.

## 2020-12-07 ENCOUNTER — TELEPHONE (OUTPATIENT)
Dept: PRIMARY CARE CLINIC | Age: 50
End: 2020-12-07

## 2020-12-07 NOTE — TELEPHONE ENCOUNTER
Patient called stating that she reccently had her Latuda lowered from 120mg to 60mg and states she is still feeling shaky and \"just not right\" and was wondering if it could be lowered any more. Health Maintenance   Topic Date Due    Breast cancer screen  10/09/2020    Colon Cancer Screen FIT/FOBT  10/21/2020    DTaP/Tdap/Td vaccine (1 - Tdap) 11/10/2021 (Originally 6/23/1988)    Cervical cancer screen  11/10/2021 (Originally 6/23/1990)    Flu vaccine (1) 11/10/2021 (Originally 9/1/2020)    Shingles Vaccine (1 of 2) 11/10/2021 (Originally 6/23/2019)    Pneumococcal 0-64 years Vaccine (1 of 1 - PPSV23) 11/10/2021 (Originally 6/23/1975)    HIV screen  11/10/2021 (Originally 6/23/1984)    Lipid screen  12/28/2020    Hepatitis A vaccine  Aged Out    Hepatitis B vaccine  Aged Out    Hib vaccine  Aged Out    Meningococcal (ACWY) vaccine  Aged Out             (applicable per patient's age: Cancer Screenings, Depression Screening, Fall Risk Screening, Immunizations)    LDL Cholesterol (mg/dL)   Date Value   12/28/2019 96     AST (U/L)   Date Value   12/22/2017 12     ALT (U/L)   Date Value   12/22/2017 13     BUN (mg/dL)   Date Value   11/19/2016 13      (goal A1C is < 7)   (goal LDL is <100) need 30-50% reduction from baseline     BP Readings from Last 3 Encounters:   11/10/20 122/84   08/12/20 120/67   07/21/20 111/69    (goal /80)      All Future Testing planned in [facility]:  Lab Frequency Next Occurrence   MOISES MEGHAN DIGITAL DIAGNOSTIC BILATERAL Once 02/10/2021   ALT Once 05/10/2021   AST Once 98/09/4578   Basic Metabolic Panel Once 45/13/2341   Lipid Panel Once 05/09/2021       Next Visit Date:  Future Appointments   Date Time Provider Buzz Garcia   1/11/2021  4:00 PM [facility]   5/10/2021  2:00 PM Deborah Kelly Might, APRN - CNP [facility]            There is no problem list on file for this patient.

## 2020-12-07 NOTE — TELEPHONE ENCOUNTER
I have lowered the dose to 40 mg.  I would also like her to see telepsych for second opinion.   I have placed referral.  Thank you

## 2020-12-14 ENCOUNTER — TELEPHONE (OUTPATIENT)
Dept: PRIMARY CARE CLINIC | Age: 50
End: 2020-12-14

## 2020-12-14 NOTE — TELEPHONE ENCOUNTER
José Dow called as she had not heard from  [facility] to schedule. José Dow would like an in person appt, not virtual.    I contacted Dorothy Serrato @ Liam's office. Referral was sent to general psych and not received by his office. She can contact pt to schedule, but [facility] is only doing virtual visits. They are scheduling at the end of January. Do you want to refer José Dow to another practice for in person visit? Please advise?

## 2021-01-25 DIAGNOSIS — R45.0 NERVOUSNESS: ICD-10-CM

## 2021-01-25 DIAGNOSIS — F34.1 DYSTHYMIA: ICD-10-CM

## 2021-01-25 RX ORDER — DESVENLAFAXINE 100 MG/1
TABLET, EXTENDED RELEASE ORAL
Qty: 90 TABLET | Refills: 1 | Status: SHIPPED | OUTPATIENT
Start: 2021-01-25

## 2021-01-25 RX ORDER — CLONAZEPAM 0.5 MG/1
0.5 TABLET ORAL 2 TIMES DAILY PRN
Qty: 60 TABLET | Refills: 2 | Status: SHIPPED | OUTPATIENT
Start: 2021-01-25 | End: 2021-05-13

## 2021-01-25 NOTE — TELEPHONE ENCOUNTER
Controlled Substance Monitoring:    Acute and Chronic Pain Monitoring:   RX Monitoring 1/25/2021   Periodic Controlled Substance Monitoring No signs of potential drug abuse or diversion identified.

## 2021-01-25 NOTE — TELEPHONE ENCOUNTER
Phone call from patient requesting refulls. Health Maintenance   Topic Date Due    Hepatitis C screen  1970    Breast cancer screen  10/09/2020    Colon Cancer Screen FIT/FOBT  10/21/2020    Lipid screen  12/28/2020    DTaP/Tdap/Td vaccine (1 - Tdap) 11/10/2021 (Originally 6/23/1988)    Cervical cancer screen  11/10/2021 (Originally 6/23/1990)    Flu vaccine (1) 11/10/2021 (Originally 9/1/2020)    Shingles Vaccine (1 of 2) 11/10/2021 (Originally 6/23/2019)    Pneumococcal 0-64 years Vaccine (1 of 1 - PPSV23) 11/10/2021 (Originally 6/23/1975)    HIV screen  11/10/2021 (Originally 6/23/1984)    Hepatitis A vaccine  Aged Out    Hepatitis B vaccine  Aged Out    Hib vaccine  Aged Out    Meningococcal (ACWY) vaccine  Aged Out             (applicable per patient's age: Cancer Screenings, Depression Screening, Fall Risk Screening, Immunizations)    LDL Cholesterol (mg/dL)   Date Value   12/28/2019 96     AST (U/L)   Date Value   12/22/2017 12     ALT (U/L)   Date Value   12/22/2017 13     BUN (mg/dL)   Date Value   11/19/2016 13      (goal A1C is < 7)   (goal LDL is <100) need 30-50% reduction from baseline     BP Readings from Last 3 Encounters:   11/10/20 122/84   08/12/20 120/67   07/21/20 111/69    (goal /80)      All Future Testing planned in [facility]:  Lab Frequency Next Occurrence   MOISES MEGHAN DIGITAL DIAGNOSTIC BILATERAL Once 02/10/2021   ALT Once 05/10/2021   AST Once 69/01/0067   Basic Metabolic Panel Once 43/51/1964   Lipid Panel Once 05/09/2021       Next Visit Date:  Future Appointments   Date Time Provider Buzz Garcia   5/10/2021  2:00 PM Jeanine Gonzalez, APRN - CNP [facility]            There is no problem list on file for this patient.

## 2021-02-25 ENCOUNTER — TELEPHONE (OUTPATIENT)
Dept: PRIMARY CARE CLINIC | Age: 51
End: 2021-02-25

## 2021-02-25 NOTE — TELEPHONE ENCOUNTER
Called and spoke with Mukesh Álvarez and reminded her that Naina Botello would like her to have her mammogram done, pt states that she is busy seeing specialist and that she will try to have it scheduled in 1 month. Mammogram extended for 1 month.

## 2021-04-22 ENCOUNTER — TELEPHONE (OUTPATIENT)
Dept: PRIMARY CARE CLINIC | Age: 51
End: 2021-04-22

## 2021-05-04 ENCOUNTER — TELEPHONE (OUTPATIENT)
Dept: PRIMARY CARE CLINIC | Age: 51
End: 2021-05-04

## 2021-05-06 ENCOUNTER — TELEPHONE (OUTPATIENT)
Dept: PRIMARY CARE CLINIC | Age: 51
End: 2021-05-06

## 2021-05-08 ENCOUNTER — HOSPITAL ENCOUNTER (OUTPATIENT)
Age: 51
Discharge: HOME | End: 2021-05-08
Payer: COMMERCIAL

## 2021-05-08 DIAGNOSIS — E78.5 DYSLIPIDEMIA: ICD-10-CM

## 2021-05-08 DIAGNOSIS — E03.1 CONGENITAL HYPOTHYROIDISM WITHOUT GOITER: ICD-10-CM

## 2021-05-08 LAB
ALT SERPL-CCNC: 10 U/L (ref 5–33)
ANION GAP SERPL CALCULATED.3IONS-SCNC: 10 MMOL/L (ref 9–17)
AST SERPL-CCNC: 13 U/L
BUN BLDV-MCNC: 14 MG/DL (ref 6–20)
BUN/CREAT BLD: 20 (ref 9–20)
CALCIUM SERPL-MCNC: 9.1 MG/DL (ref 8.6–10.4)
CHLORIDE BLD-SCNC: 110 MMOL/L (ref 98–107)
CHOLESTEROL/HDL RATIO: 3.2
CHOLESTEROL: 210 MG/DL
CO2: 22 MMOL/L (ref 20–31)
CREAT SERPL-MCNC: 0.69 MG/DL (ref 0.5–0.9)
GFR AFRICAN AMERICAN: >60 ML/MIN
GFR NON-AFRICAN AMERICAN: >60 ML/MIN
GFR SERPL CREATININE-BSD FRML MDRD: ABNORMAL ML/MIN/{1.73_M2}
GFR SERPL CREATININE-BSD FRML MDRD: ABNORMAL ML/MIN/{1.73_M2}
GLUCOSE BLD-MCNC: 99 MG/DL (ref 70–99)
HDLC SERPL-MCNC: 66 MG/DL
LDL CHOLESTEROL: 134 MG/DL (ref 0–130)
POTASSIUM SERPL-SCNC: 4.5 MMOL/L (ref 3.7–5.3)
SODIUM BLD-SCNC: 142 MMOL/L (ref 135–144)
TRIGL SERPL-MCNC: 50 MG/DL
VLDLC SERPL CALC-MCNC: ABNORMAL MG/DL (ref 1–30)

## 2021-05-08 PROCEDURE — 36415 COLL VENOUS BLD VENIPUNCTURE: CPT

## 2021-05-08 PROCEDURE — 84450 TRANSFERASE (AST) (SGOT): CPT

## 2021-05-08 PROCEDURE — 80048 BASIC METABOLIC PNL TOTAL CA: CPT

## 2021-05-08 PROCEDURE — 84460 ALANINE AMINO (ALT) (SGPT): CPT

## 2021-05-08 PROCEDURE — 80061 LIPID PANEL: CPT

## 2021-05-08 PROCEDURE — 84439 ASSAY OF FREE THYROXINE: CPT

## 2021-05-08 PROCEDURE — 84443 ASSAY THYROID STIM HORMONE: CPT

## 2021-05-10 DIAGNOSIS — E03.1 CONGENITAL HYPOTHYROIDISM WITHOUT GOITER: Primary | ICD-10-CM

## 2021-05-10 RX ORDER — LEVOTHYROXINE SODIUM 0.05 MG/1
50 TABLET ORAL DAILY
Qty: 90 TABLET | Refills: 3 | Status: SHIPPED | OUTPATIENT
Start: 2021-05-10 | End: 2022-05-04 | Stop reason: SDUPTHER

## 2021-05-10 NOTE — TELEPHONE ENCOUNTER
Health Maintenance   Topic Date Due    Hepatitis C screen  Never done    COVID-19 Vaccine (1) Never done    Breast cancer screen  10/09/2020    Colon Cancer Screen FIT/FOBT  10/21/2020    DTaP/Tdap/Td vaccine (1 - Tdap) 11/10/2021 (Originally 6/23/1988)    Cervical cancer screen  11/10/2021 (Originally 6/23/1990)    Flu vaccine (Season Ended) 11/10/2021 (Originally 9/1/2021)    Shingles Vaccine (1 of 2) 11/10/2021 (Originally 6/23/2019)    Pneumococcal 0-64 years Vaccine (1 of 1 - PPSV23) 11/10/2021 (Originally 6/23/1975)    HIV screen  11/10/2021 (Originally 6/23/1984)    Lipid screen  05/08/2022    Hepatitis A vaccine  Aged Out    Hepatitis B vaccine  Aged Out    Hib vaccine  Aged Out    Meningococcal (ACWY) vaccine  Aged Out             (applicable per patient's age: Cancer Screenings, Depression Screening, Fall Risk Screening, Immunizations)    LDL Cholesterol (mg/dL)   Date Value   05/08/2021 134 (H)     AST (U/L)   Date Value   05/08/2021 13     ALT (U/L)   Date Value   05/08/2021 10     BUN (mg/dL)   Date Value   05/08/2021 14      (goal A1C is < 7)   (goal LDL is <100) need 30-50% reduction from baseline     BP Readings from Last 3 Encounters:   11/10/20 122/84   08/12/20 120/67   07/21/20 111/69    (goal /80)      All Future Testing planned in [facility]:  Lab Frequency Next Occurrence   MOISES MEGHAN DIGITAL DIAGNOSTIC BILATERAL Once 05/13/2021       Next Visit Date:  Future Appointments   Date Time Provider Buzz Garcia   5/13/2021  4:00 PM Aleksandar Gonzalez, APRN - CNP [facility]            There is no problem list on file for this patient.

## 2021-05-11 LAB
THYROXINE, FREE: 1.04 NG/DL (ref 0.93–1.7)
TSH SERPL DL<=0.05 MIU/L-ACNC: 1.92 MIU/L (ref 0.3–5)

## 2021-05-12 ENCOUNTER — TELEPHONE (OUTPATIENT)
Dept: PRIMARY CARE CLINIC | Age: 51
End: 2021-05-12

## 2021-05-12 NOTE — TELEPHONE ENCOUNTER
----- Message from LUIS Wilson CNP sent at 5/12/2021  6:50 AM EDT -----  Please notify patient that their lab results are normal.

## 2021-05-13 ENCOUNTER — OFFICE VISIT (OUTPATIENT)
Dept: PRIMARY CARE CLINIC | Age: 51
End: 2021-05-13
Payer: COMMERCIAL

## 2021-05-13 VITALS
RESPIRATION RATE: 18 BRPM | WEIGHT: 151 LBS | HEART RATE: 85 BPM | OXYGEN SATURATION: 97 % | DIASTOLIC BLOOD PRESSURE: 82 MMHG | SYSTOLIC BLOOD PRESSURE: 122 MMHG | HEIGHT: 68 IN | BODY MASS INDEX: 22.88 KG/M2

## 2021-05-13 DIAGNOSIS — F34.1 DYSTHYMIA: ICD-10-CM

## 2021-05-13 DIAGNOSIS — E03.1 CONGENITAL HYPOTHYROIDISM WITHOUT GOITER: Primary | ICD-10-CM

## 2021-05-13 DIAGNOSIS — E78.5 DYSLIPIDEMIA: ICD-10-CM

## 2021-05-13 PROCEDURE — 99214 OFFICE O/P EST MOD 30 MIN: CPT | Performed by: NURSE PRACTITIONER

## 2021-05-13 RX ORDER — QUETIAPINE FUMARATE 200 MG/1
TABLET, FILM COATED ORAL
COMMUNITY
Start: 2021-04-12 | End: 2021-10-13

## 2021-05-13 ASSESSMENT — ENCOUNTER SYMPTOMS
ABDOMINAL PAIN: 0
NAUSEA: 0
SHORTNESS OF BREATH: 0
WHEEZING: 0
SORE THROAT: 0
CONSTIPATION: 0
RHINORRHEA: 0
COUGH: 0
VOMITING: 0
DIARRHEA: 0

## 2021-05-13 ASSESSMENT — PATIENT HEALTH QUESTIONNAIRE - PHQ9
SUM OF ALL RESPONSES TO PHQ9 QUESTIONS 1 & 2: 0
SUM OF ALL RESPONSES TO PHQ QUESTIONS 1-9: 0
SUM OF ALL RESPONSES TO PHQ QUESTIONS 1-9: 0

## 2021-05-13 NOTE — PATIENT INSTRUCTIONS
SURVEY:    You may be receiving a survey from [employer] regarding your visit today. Please complete the survey to enable us to provide the highest quality of care to you and your family. If you cannot score us a very good on any question, please call the office to discuss how we could have made your experience a very good one. Thank you.   Piyush Gonzalez, APRN-CNP  Raúl Ortiz, APRN-CNP  Colonel Leonie LPN  [facility], TIA Pleitez MA Pam, PCA

## 2021-05-13 NOTE — PROGRESS NOTES
Name: Arnoldo Caro  : 1970         Chief Complaint:     Chief Complaint   Patient presents with   [address] Problem     Routine office visit. States \"doing better\"    Alopecia     x 6 months. History of Present Illness:      Arnoldo Caro is a 45 y.o.  female who presents with Mental Health Problem (Routine office visit. States \"doing better\") and Alopecia (x 6 months. )      Hu Pitts is here today for routine office visit. Dysthymia-states she is doing very well. She is continuing with her psychiatrist and medications have been adjusted. Hypothyroidism-stable, labs are normal.  She denies any fatigue or excessive daytime sleepiness. She is complaining of some hair thinning which may be related to her eating habits. She states she just started taking vitamins. I have recommended keeping the same dose of levothyroxine as her labs are stable. She is agreeable. Hyperlipidemia  This is a chronic problem. The current episode started more than 1 year ago. The problem is controlled. Recent lipid tests were reviewed and are normal. Exacerbating diseases include hypothyroidism. She has no history of chronic renal disease, diabetes, liver disease, obesity or nephrotic syndrome. Factors aggravating her hyperlipidemia include fatty foods. Pertinent negatives include no chest pain, leg pain, myalgias or shortness of breath. Current antihyperlipidemic treatment includes statins. The current treatment provides moderate improvement of lipids. Compliance problems include adherence to exercise.   Risk factors for coronary artery disease include dyslipidemia, family history, stress and post-menopausal.         Past Medical History:     Past Medical History:   Diagnosis Date    Anxiety     Depression     Fibroadenoma of left breast     Hyperlipidemia     Multinodular thyroid       Reviewed all health maintenance requirements and ordered appropriate tests  Health Maintenance Due   Topic Date Due  Breast cancer screen  10/09/2020    Colon Cancer Screen FIT/FOBT  10/21/2020       Past Surgical History:     Past Surgical History:   Procedure Laterality Date    EYE SURGERY      age 9    TUBAL LIGATION          Medications:       Prior to Admission medications    Medication Sig Start Date End Date Taking? Authorizing Provider   QUEtiapine (SEROQUEL) 200 MG tablet  4/12/21  Yes Historical Provider, MD   levothyroxine (SYNTHROID) 50 MCG tablet Take 1 tablet by mouth Daily 5/10/21  Yes [de-identified] M LUIS Ortiz CNP   desvenlafaxine succinate (PRISTIQ) 100 MG TB24 extended release tablet TAKE 1 TABLET BY MOUTH ONCE DAILY 1/25/21  Yes LUIS Muller CNP   atorvastatin (LIPITOR) 40 MG tablet Take 1 tablet by mouth daily 11/9/20  Yes LUIS Muller CNP   loratadine (CLARITIN) 10 MG tablet Take 10 mg by mouth daily   Yes Historical Provider, MD   fluticasone (FLONASE) 50 MCG/ACT nasal spray 1 spray by Each Nostril route daily   Yes Historical Provider, MD        Allergies:       Pcn [penicillins], Clindamycin hcl, and Panlor [apap-caff-dihydrocodeine]    Social History:     Tobacco:    reports that she has been smoking. She has a 30.00 pack-year smoking history. She has never used smokeless tobacco.  Alcohol:      reports current alcohol use. Drug Use:  reports no history of drug use. Family History:     Family History   Problem Relation Age of Onset   Ara Melendez ADHD Mother     Cancer Father        Review of Systems:     Positive and Negative as described in HPI    Review of Systems   Constitutional: Negative for chills, fatigue and fever. HENT: Negative for congestion, rhinorrhea and sore throat. Eyes: Negative for visual disturbance. Respiratory: Negative for cough, shortness of breath and wheezing. Cardiovascular: Negative for chest pain and palpitations. Gastrointestinal: Negative for abdominal pain, constipation, diarrhea, nausea and vomiting.    Genitourinary: Negative for difficulty urinating and dysuria. Musculoskeletal: Negative for gait problem, myalgias, neck pain and neck stiffness. Skin: Negative for rash. Neurological: Negative for dizziness, syncope, light-headedness and headaches. Psychiatric/Behavioral: Negative for agitation, behavioral problems, confusion, decreased concentration, dysphoric mood, hallucinations, self-injury, sleep disturbance and suicidal ideas. The patient is not nervous/anxious and is not hyperactive. Physical Exam:   Vitals:  /82 (Site: Left Upper Arm, Position: Sitting, Cuff Size: Large Adult)   Pulse 85   Resp 18   Ht 5' 8\" (1.727 m)   Wt 151 lb (68.5 kg)   SpO2 97%   BMI 22.96 kg/m²     Physical Exam  Vitals signs and nursing note reviewed. Constitutional:       General: She is not in acute distress. Appearance: Normal appearance. She is not ill-appearing. HENT:      Mouth/Throat:      Mouth: Mucous membranes are moist.   Eyes:      General: No scleral icterus. Conjunctiva/sclera: Conjunctivae normal.   Neck:      Musculoskeletal: Normal range of motion and neck supple. Vascular: No carotid bruit. Cardiovascular:      Rate and Rhythm: Normal rate and regular rhythm. Heart sounds: No murmur. Pulmonary:      Effort: Pulmonary effort is normal.      Breath sounds: Normal breath sounds. No wheezing. Abdominal:      General: Bowel sounds are normal. There is no distension. Palpations: Abdomen is soft. Tenderness: There is no abdominal tenderness. Musculoskeletal: Normal range of motion. Right lower leg: No edema. Left lower leg: No edema. Skin:     General: Skin is warm and dry. Findings: No rash. Neurological:      Mental Status: She is alert and oriented to person, place, and time.    Psychiatric:         Mood and Affect: Mood normal.         Behavior: Behavior normal.         Data:     Lab Results   Component Value Date     05/08/2021    K 4.5 05/08/2021     05/08/2021    CO2 22 05/08/2021    BUN 14 05/08/2021    CREATININE 0.69 05/08/2021    GLUCOSE 99 05/08/2021    PROT 7.3 11/19/2016    LABALBU 4.3 11/19/2016    BILITOT 0.33 11/19/2016    ALKPHOS 65 11/19/2016    AST 13 05/08/2021    ALT 10 05/08/2021     Lab Results   Component Value Date    WBC 9.6 11/19/2016    RBC 4.65 11/19/2016    HGB 14.0 11/19/2016    HCT 43.2 11/19/2016    MCV 92.9 11/19/2016    MCH 30.1 11/19/2016    MCHC 32.4 11/19/2016    RDW 13.8 11/19/2016     11/19/2016    MPV 9.7 11/19/2016     Lab Results   Component Value Date    TSH 1.92 05/08/2021     Lab Results   Component Value Date    CHOL 210 05/08/2021    HDL 66 05/08/2021       Assessment/Plan:      Diagnosis Orders   1. Congenital hypothyroidism without goiter     2. Dysthymia     3. Dyslipidemia       We will continue all current medications. Labs are stable. We will see her back in 6 months, sooner if any problems. 1.  Karri Carter received counseling on the following healthy behaviors: nutrition, exercise and medication adherence  2. Patient given educational materials - see patient instructions  3. Was a self-tracking handout given in paper form or via [employer]? No  If yes, see orders or list here. 4.  Discussed use, benefit, and side effects of prescribed medications. Barriers to medication compliance addressed. All patient questions answered. Pt voiced understanding. 5.  Reviewed prior labs and health maintenance  6. Continue current medications, diet and exercise. Completed Refills   Requested Prescriptions      No prescriptions requested or ordered in this encounter         Return in about 6 months (around 11/13/2021) for Check up.

## 2021-05-27 ENCOUNTER — TELEPHONE (OUTPATIENT)
Dept: PRIMARY CARE CLINIC | Age: 51
End: 2021-05-27

## 2021-05-27 NOTE — TELEPHONE ENCOUNTER
Message left to call office regarding mammogram test. Patient canceled test does not want to reschedule.

## 2021-05-28 ENCOUNTER — TELEPHONE (OUTPATIENT)
Dept: PRIMARY CARE CLINIC | Age: 51
End: 2021-05-28

## 2021-05-28 NOTE — TELEPHONE ENCOUNTER
Called patient and left message reminding her to reschedule her mammogram, phone number provided. To call office with any questions.

## 2021-07-19 ENCOUNTER — OFFICE VISIT (OUTPATIENT)
Dept: PRIMARY CARE CLINIC | Age: 51
End: 2021-07-19
Payer: COMMERCIAL

## 2021-07-19 VITALS
BODY MASS INDEX: 23.49 KG/M2 | SYSTOLIC BLOOD PRESSURE: 122 MMHG | DIASTOLIC BLOOD PRESSURE: 74 MMHG | RESPIRATION RATE: 18 BRPM | TEMPERATURE: 98.1 F | HEART RATE: 84 BPM | OXYGEN SATURATION: 97 % | WEIGHT: 155 LBS | HEIGHT: 68 IN

## 2021-07-19 DIAGNOSIS — M54.2 NECK PAIN ON RIGHT SIDE: Primary | ICD-10-CM

## 2021-07-19 PROCEDURE — 99213 OFFICE O/P EST LOW 20 MIN: CPT | Performed by: NURSE PRACTITIONER

## 2021-07-19 ASSESSMENT — ENCOUNTER SYMPTOMS
EYES NEGATIVE: 1
GASTROINTESTINAL NEGATIVE: 1
RHINORRHEA: 0
RESPIRATORY NEGATIVE: 1
SORE THROAT: 1

## 2021-07-19 NOTE — PROGRESS NOTES
[address] [facility] WALK-IN CARE  [address] [facility] [address] [facility]  Dept: [phone]  Dept Fax: [phone] is a 45 y.o. female who presents to the [facility] today for her medical conditions/complaints as noted below. Javier Cobb is c/o of Pharyngitis (x 1 week. c/o neck pain that is painful to eat, throbbing and feels like gland is swollen on right side. )      HPI:    Javier Cobb is a 45 y.o. female who presents with  Right sided gland/throat pain. This has been going on for approximately 1 week. Denies a fever. She states when she opens her throat wide like yawing she has a pain in the right side of her throat. She states that the pain can be sharp and stabbing but is mainly dull. She has been taking Ibuprofen that has mild improvement. Denies pain with turning her head. Denies any cold symptoms at this time. Denies any dental pain. Pharyngitis  Associated symptoms include a sore throat (right side). Pertinent negatives include no fever, headaches or myalgias. Past Medical History:   Diagnosis Date    Anxiety     Depression     Fibroadenoma of left breast     Hyperlipidemia     Multinodular thyroid         Current Outpatient Medications   Medication Sig Dispense Refill    QUEtiapine (SEROQUEL) 200 MG tablet       levothyroxine (SYNTHROID) 50 MCG tablet Take 1 tablet by mouth Daily 90 tablet 3    desvenlafaxine succinate (PRISTIQ) 100 MG TB24 extended release tablet TAKE 1 TABLET BY MOUTH ONCE DAILY 90 tablet 1    atorvastatin (LIPITOR) 40 MG tablet Take 1 tablet by mouth daily 90 tablet 1    loratadine (CLARITIN) 10 MG tablet Take 10 mg by mouth daily (Patient not taking: Reported on 7/19/2021)      fluticasone (FLONASE) 50 MCG/ACT nasal spray 1 spray by Each Nostril route daily (Patient not taking: Reported on 7/19/2021)       No current facility-administered medications for this visit.      Allergies Allergen Reactions    Pcn [Penicillins]     Clindamycin Hcl Rash    Panlor [Apap-Caff-Dihydrocodeine] Hives and Rash       Subjective:      Review of Systems   Constitutional: Negative. Negative for appetite change and fever. HENT: Positive for ear pain (right side) and sore throat (right side). Negative for dental problem and rhinorrhea. Eyes: Negative. Respiratory: Negative. Cardiovascular: Negative. Gastrointestinal: Negative. Endocrine: Negative. Genitourinary: Negative. Musculoskeletal: Negative. Negative for myalgias. Skin: Negative. Allergic/Immunologic: Positive for environmental allergies. Neurological: Negative. Negative for headaches. Hematological: Negative. Psychiatric/Behavioral: Negative. Objective:     Physical Exam  Vitals and nursing note reviewed. Constitutional:       Appearance: Normal appearance. HENT:      Head: Normocephalic. Jaw: No tenderness, swelling or pain on movement. Salivary Glands: Right salivary gland is tender. Right salivary gland is not diffusely enlarged. Left salivary gland is not diffusely enlarged or tender. Right Ear: Tympanic membrane normal.      Left Ear: Tympanic membrane normal.      Nose: Nose normal.      Mouth/Throat:      Lips: Pink. Mouth: Mucous membranes are moist. No oral lesions. Dentition: Normal dentition. No dental tenderness or gum lesions. Tongue: No lesions. Palate: No mass. Pharynx: Oropharynx is clear. Eyes:      Pupils: Pupils are equal, round, and reactive to light. Cardiovascular:      Rate and Rhythm: Normal rate and regular rhythm. Heart sounds: Normal heart sounds. Pulmonary:      Effort: Pulmonary effort is normal.      Breath sounds: Normal breath sounds. Musculoskeletal:         General: Normal range of motion. Cervical back: Normal range of motion. No rigidity. Lymphadenopathy:      Cervical: No cervical adenopathy.    Skin: General: Skin is warm. Capillary Refill: Capillary refill takes less than 2 seconds. Neurological:      General: No focal deficit present. Mental Status: She is alert. Psychiatric:         Mood and Affect: Mood normal.       /74 (Site: Left Upper Arm, Position: Sitting, Cuff Size: Large Adult)   Pulse 84   Temp 98.1 °F (36.7 °C) (Temporal)   Resp 18   Ht 5' 8\" (1.727 m)   Wt 155 lb (70.3 kg)   SpO2 97%   BMI 23.57 kg/m²     Assessment:      Diagnosis Orders   1. Neck pain on right side  XR NECK SOFT TISSUE     No results found for this visit on 07/19/21. Plan:     Discussed POC with patients PCP. Will get a neck ultrasound at this time and follow up with PCP. Continue taking Ibuprofen for pain control. Will call patient with ultrasound results. No follow-ups on file. No orders of the defined types were placed in this encounter.        Electronically signed by LUIS Forte CNP on 7/20/2021 at 10:40 AM

## 2021-07-19 NOTE — PATIENT INSTRUCTIONS
SURVEY:    You may be receiving a survey from [employer] regarding your visit today. Please complete the survey to enable us to provide the highest quality of care to you and your family. If you cannot score us a very good on any question, please call the office to discuss how we could have made your experience a very good one. Thank you. Nayeli Gonzalez, APRN-CNP  Raúl Nikkileonidas, APRN-CNP  Kulwinder Moreno, KEYONNA Cameron, LPKADIE Adrian, [address] [facility], [facility] [place], [facility]    Patient Education        Sore Throat: Care Instructions  Your Care Instructions     Infection by bacteria or a virus causes most sore throats. Cigarette smoke, dry air, air pollution, allergies, and yelling can also cause a sore throat. Sore throats can be painful and annoying. Fortunately, most sore throats go away on their own. If you have a bacterial infection, your doctor may prescribe antibiotics. Follow-up care is a key part of your treatment and safety. Be sure to make and go to all appointments, and call your doctor if you are having problems. It's also a good idea to know your test results and keep a list of the medicines you take. How can you care for yourself at home? · If your doctor prescribed antibiotics, take them as directed. Do not stop taking them just because you feel better. You need to take the full course of antibiotics. · Gargle with warm salt water once an hour to help reduce swelling and relieve discomfort. Use 1 teaspoon of salt mixed in 1 cup of warm water. · Take an over-the-counter pain medicine, such as acetaminophen (Tylenol), ibuprofen (Advil, Motrin), or naproxen (Aleve). Read and follow all instructions on the label. · Be careful when taking over-the-counter cold or flu medicines and Tylenol at the same time. Many of these medicines have acetaminophen, which is Tylenol. Read the labels to make sure that you are not taking more than the recommended dose. Too much acetaminophen (Tylenol) can be harmful.   · Drink plenty of fluids. Fluids may help soothe an irritated throat. Hot fluids, such as tea or soup, may help decrease throat pain. · Use over-the-counter throat lozenges to soothe pain. Regular cough drops or hard candy may also help. These should not be given to young children because of the risk of choking. · Do not smoke or allow others to smoke around you. If you need help quitting, talk to your doctor about stop-smoking programs and medicines. These can increase your chances of quitting for good. · Use a vaporizer or humidifier to add moisture to your bedroom. Follow the directions for cleaning the machine. When should you call for help? Call your doctor now or seek immediate medical care if:    · You have new or worse trouble swallowing.     · Your sore throat gets much worse on one side. Watch closely for changes in your health, and be sure to contact your doctor if you do not get better as expected. Where can you learn more? Go to https://Notch Wearable Movement Capture."RapidValue Solutions[employer]". org and sign in to your Workube account. Enter V858 in the Mibuzz.tv box to learn more about \"Sore Throat: Care Instructions. \"     If you do not have an account, please click on the \"Sign Up Now\" link. Current as of: December 2, 2020               Content Version: 12.9  © 2006-2021 [employer]. Care instructions adapted under license by [facility] ([facility]). If you have questions about a medical condition or this instruction, always ask your healthcare professional. [place] [address] any warranty or liability for your use of this information. Patient Education        Neck Pain: Care Instructions  Your Care Instructions     You can have neck pain anywhere from the bottom of your head to the top of your shoulders. It can spread to the upper back or arms. Injuries, painting a ceiling, sleeping with your neck twisted, staying in one position for too long, and many other activities can cause neck pain.   Most neck pain gets better with home care. Your doctor may recommend medicine to relieve pain or relax your muscles. He or she may suggest exercise and physical therapy to increase flexibility and relieve stress. You may need to wear a special (cervical) collar to support your neck for a day or two. Follow-up care is a key part of your treatment and safety. Be sure to make and go to all appointments, and call your doctor if you are having problems. It's also a good idea to know your test results and keep a list of the medicines you take. How can you care for yourself at home? · Try using a heating pad on a low or medium setting for 15 to 20 minutes every 2 or 3 hours. Try a warm shower in place of one session with the heating pad. · You can also try an ice pack for 10 to 15 minutes every 2 to 3 hours. Put a thin cloth between the ice and your skin. · Take pain medicines exactly as directed. ? If the doctor gave you a prescription medicine for pain, take it as prescribed. ? If you are not taking a prescription pain medicine, ask your doctor if you can take an over-the-counter medicine. · If your doctor recommends a cervical collar, wear it exactly as directed. When should you call for help? Call your doctor now or seek immediate medical care if:    · You have new or worsening numbness in your arms, buttocks or legs.     · You have new or worsening weakness in your arms or legs. (This could make it hard to stand up.)     · You lose control of your bladder or bowels. Watch closely for changes in your health, and be sure to contact your doctor if:    · Your neck pain is getting worse.     · You are not getting better after 1 week.     · You do not get better as expected. Where can you learn more? Go to https://brianna.Fliqz. org and sign in to your Platiza account. Enter [id number] in the DiscountDoc box to learn more about \"Neck Pain: Care Instructions. \"     If you do not have an account, please click on the \"Sign Up Now\" link. Current as of: November 16, 2020               Content Version: 12.9  © 2006-2021 Healthwise, Incorporated. Care instructions adapted under license by [facility] ([facility]). If you have questions about a medical condition or this instruction, always ask your healthcare professional. [address] any warranty or liability for your use of this information.

## 2021-07-23 ENCOUNTER — HOSPITAL ENCOUNTER (OUTPATIENT)
Dept: GENERAL RADIOLOGY | Age: 51
Discharge: HOME | End: 2021-07-25
Payer: COMMERCIAL

## 2021-07-23 ENCOUNTER — HOSPITAL ENCOUNTER (OUTPATIENT)
Age: 51
Discharge: HOME | End: 2021-07-25
Payer: COMMERCIAL

## 2021-07-23 DIAGNOSIS — M54.2 NECK PAIN ON RIGHT SIDE: ICD-10-CM

## 2021-07-23 PROCEDURE — 70360 X-RAY EXAM OF NECK: CPT

## 2021-07-26 ENCOUNTER — TELEPHONE (OUTPATIENT)
Dept: PRIMARY CARE CLINIC | Age: 51
End: 2021-07-26

## 2021-07-26 NOTE — TELEPHONE ENCOUNTER
Called patient re: phone call from call center. Patient states she received her test results and that she is continuing to have the pain and she cancelled her scheduled appt for 8/6 and she \"will just have to deal with the pain. \"

## 2021-07-26 NOTE — TELEPHONE ENCOUNTER
----- Message from LUIS Maya CNP sent at 7/23/2021  3:59 PM EDT -----  Mild age related changes noted to the spine, soft tissue unremarkable. Thank you.

## 2021-07-26 NOTE — TELEPHONE ENCOUNTER
Contacted patient in regards to lab results. Patient verbalized understanding and is scheduled 8/6 @11 am for follow up.

## 2021-07-26 NOTE — TELEPHONE ENCOUNTER
----- Message from LUIS Penn CNP sent at 7/26/2021 10:39 AM EDT -----  Please notify patient that her xray returned with just some mild age related changes to the spine. The soft tissue was unremarkable. Ask her is she is feeling better? If still having pain follow up with PCP.   Thanks [facility]

## 2021-10-13 ENCOUNTER — OFFICE VISIT (OUTPATIENT)
Dept: PRIMARY CARE CLINIC | Age: 51
End: 2021-10-13
Payer: COMMERCIAL

## 2021-10-13 ENCOUNTER — HOSPITAL ENCOUNTER (OUTPATIENT)
Dept: LAB | Age: 51
Setting detail: SPECIMEN
Discharge: HOME | End: 2021-10-13
Payer: COMMERCIAL

## 2021-10-13 ENCOUNTER — TELEPHONE (OUTPATIENT)
Dept: PRIMARY CARE CLINIC | Age: 51
End: 2021-10-13

## 2021-10-13 VITALS
DIASTOLIC BLOOD PRESSURE: 72 MMHG | HEART RATE: 71 BPM | TEMPERATURE: 98.5 F | SYSTOLIC BLOOD PRESSURE: 116 MMHG | BODY MASS INDEX: 24.01 KG/M2 | HEIGHT: 68 IN | WEIGHT: 158.4 LBS | RESPIRATION RATE: 18 BRPM | OXYGEN SATURATION: 98 %

## 2021-10-13 DIAGNOSIS — R51.9 ACUTE NONINTRACTABLE HEADACHE, UNSPECIFIED HEADACHE TYPE: ICD-10-CM

## 2021-10-13 DIAGNOSIS — J30.9 ALLERGIC RHINITIS, UNSPECIFIED SEASONALITY, UNSPECIFIED TRIGGER: Primary | ICD-10-CM

## 2021-10-13 PROCEDURE — C9803 HOPD COVID-19 SPEC COLLECT: HCPCS

## 2021-10-13 PROCEDURE — 99213 OFFICE O/P EST LOW 20 MIN: CPT | Performed by: NURSE PRACTITIONER

## 2021-10-13 PROCEDURE — U0005 INFEC AGEN DETEC AMPLI PROBE: HCPCS

## 2021-10-13 PROCEDURE — U0003 INFECTIOUS AGENT DETECTION BY NUCLEIC ACID (DNA OR RNA); SEVERE ACUTE RESPIRATORY SYNDROME CORONAVIRUS 2 (SARS-COV-2) (CORONAVIRUS DISEASE [COVID-19]), AMPLIFIED PROBE TECHNIQUE, MAKING USE OF HIGH THROUGHPUT TECHNOLOGIES AS DESCRIBED BY CMS-2020-01-R: HCPCS

## 2021-10-13 RX ORDER — QUETIAPINE FUMARATE 400 MG/1
TABLET, FILM COATED ORAL
COMMUNITY
Start: 2021-09-20

## 2021-10-13 ASSESSMENT — ENCOUNTER SYMPTOMS
GASTROINTESTINAL NEGATIVE: 1
RHINORRHEA: 1
EYE DISCHARGE: 1
EYE REDNESS: 1
SINUS PRESSURE: 1
RESPIRATORY NEGATIVE: 1

## 2021-10-13 NOTE — PROGRESS NOTES
[facility]  [address] [facility] [address] [facility]  Dept: [phone]  Dept Fax: [phone] is a 45 y.o. female who presents to the [facility] in [facility] today for her medical conditions/complaints as noted below. Hubert Christian is c/o of Drainage (x 1 day. c/o right eye drainage. ), Congestion (x 1 day.), and Headache (x 1 day. )      HPI:    Hubert Christian is a 45 y.o. female who presents with  Right eye red and draining clear fluid, congestion, and headache. She states the symptoms started yesterday and she attributes them to her allergies. She was sent home from work and needs a note stating she does not have Covid to return. She denies itching in the eye or pain. The drainage has been clear and watery. No fever, no cough. She has rhinorrhea and congestion. She states she feels a lot of pressure in her head. She has been taking daily Claritin, and Benadryl. She has used her Flonase. She states no improvement in symptoms at this time. Past Medical History:   Diagnosis Date    Anxiety     Depression     Fibroadenoma of left breast     Hyperlipidemia     Multinodular thyroid         Current Outpatient Medications   Medication Sig Dispense Refill    levothyroxine (SYNTHROID) 50 MCG tablet Take 1 tablet by mouth Daily 90 tablet 3    desvenlafaxine succinate (PRISTIQ) 100 MG TB24 extended release tablet TAKE 1 TABLET BY MOUTH ONCE DAILY 90 tablet 1    atorvastatin (LIPITOR) 40 MG tablet Take 1 tablet by mouth daily 90 tablet 1    loratadine (CLARITIN) 10 MG tablet Take 10 mg by mouth daily       QUEtiapine (SEROQUEL) 400 MG tablet       fluticasone (FLONASE) 50 MCG/ACT nasal spray 1 spray by Each Nostril route daily (Patient not taking: Reported on 7/19/2021)       No current facility-administered medications for this visit.      Allergies   Allergen Reactions    Pcn [Penicillins]     Clindamycin Hcl Rash    Panlor [Apap-Caff-Dihydrocodeine] Hives and Rash       Subjective:      Review of Systems   Constitutional: Positive for fatigue. Negative for appetite change and fever. HENT: Positive for congestion, postnasal drip, rhinorrhea and sinus pressure. Negative for ear pain. Eyes: Positive for discharge and redness. Respiratory: Negative. Cardiovascular: Negative. Gastrointestinal: Negative. Endocrine: Negative. Genitourinary: Negative. Musculoskeletal: Negative. Skin: Negative. Allergic/Immunologic: Positive for environmental allergies. Neurological: Positive for headaches. Hematological: Negative. Psychiatric/Behavioral: Negative. Objective:     Physical Exam  Vitals and nursing note reviewed. Constitutional:       Appearance: Normal appearance. HENT:      Head: Normocephalic. Right Ear: Tympanic membrane normal.      Left Ear: Tympanic membrane normal.      Ears:      Comments: Cloudy fluid bilaterally       Nose: Rhinorrhea present. Rhinorrhea is clear. Right Sinus: Maxillary sinus tenderness present. Left Sinus: Maxillary sinus tenderness and frontal sinus tenderness present. Mouth/Throat:      Lips: Pink. Mouth: Mucous membranes are moist.      Pharynx: Oropharynx is clear. Eyes:      General: Lids are normal.      Conjunctiva/sclera:      Right eye: Right conjunctiva is injected. Exudate (clear watery) present. Left eye: Left conjunctiva is not injected. Pupils: Pupils are equal, round, and reactive to light. Cardiovascular:      Rate and Rhythm: Normal rate and regular rhythm. Heart sounds: Normal heart sounds. Pulmonary:      Effort: Pulmonary effort is normal.      Breath sounds: Normal breath sounds. Musculoskeletal:         General: Normal range of motion. Cervical back: Normal range of motion and neck supple. Lymphadenopathy:      Cervical: No cervical adenopathy. Skin:     General: Skin is warm.       Capillary

## 2021-10-13 NOTE — TELEPHONE ENCOUNTER
----- Message from Adan Baron sent at 10/13/2021  9:41 AM EDT -----  Subject: Message to Provider    QUESTIONS  Information for Provider? Patient would like to be called if someone   happens to not show up for an appointment today, so she can return to work   for allergies. ---------------------------------------------------------------------------  --------------  Alisia SAM  What is the best way for the office to contact you? OK to leave message on   voicemail  Preferred Call Back Phone Number? [phone]  ---------------------------------------------------------------------------  --------------  SCRIPT ANSWERS  Relationship to Patient?  Self

## 2021-10-13 NOTE — LETTER
[facility], [address] [facility]      LUIS Yanez CNP      10/13/2021     Patient: Cedric Beebe   YOB: 1970       To Whom It May Concern: It is my medical opinion that Cedric Beebe should remain out of school/work while acutely ill and awaiting COVID-19 test results. Return to school/work with no retesting should be followed if test is negative AND meets these 3 criteria as outlined by CDC/ODH:     a. No fever without the use of fever reducers for 24 hours  b. Improvement in symptoms  c. At least 7 days since the onset of symptoms. If tests positive for COVID-19, needs minimum of 10 days strict quarantine, improvement of symptoms and 24 hours fever free without fever reducing medications. If you have any questions or concerns, please don't hesitate to call.     Sincerely,        LUIS Yanez CNP

## 2021-10-13 NOTE — PATIENT INSTRUCTIONS
Patient Education        Allergies: Care Instructions  Overview     Allergies occur when your body's defense system (immune system) overreacts to certain substances. The immune system treats a harmless substance as if it were a harmful germ or virus. Many things can make this happen. These include pollens, medicine, food, dust, animal dander, and mold. Allergies can be mild or severe. Mild allergies can be managed with home treatment. But medicine may be needed to prevent problems. Managing your allergies is an important part of staying healthy. Your doctor may suggest that you have allergy testing to help find out what is causing your allergies. Severe allergies can cause reactions that affect your whole body (anaphylactic reactions). Your doctor may prescribe a shot of epinephrine to carry with you in case you have a severe reaction. Learn how to give yourself the shot and keep it with you at all times. Make sure it is not . Follow-up care is a key part of your treatment and safety. Be sure to make and go to all appointments, and call your doctor if you are having problems. It's also a good idea to know your test results and keep a list of the medicines you take. How can you care for yourself at home? · If you have been told by your doctor that dust or dust mites are causing your allergy, decrease the dust around your bed:  ? Wash sheets, pillowcases, and other bedding in hot water every week. ? Use dust-proof covers for pillows, duvets, and mattresses. Avoid plastic covers because they tear easily and do not \"breathe. \" Wash as instructed on the label. ? Do not use any blankets and pillows that you do not need. ? Use blankets that you can wash in your washing machine. ? Consider removing drapes and carpets, which attract and hold dust, from your bedroom. · If you are allergic to house dust and mites, do not use home humidifiers.  Your doctor can suggest ways you can control dust and mites.  · Look for signs of cockroaches. Cockroaches cause allergic reactions. Use cockroach baits to get rid of them. Then, clean your home well. Cockroaches like areas where grocery bags, newspapers, empty bottles, or cardboard boxes are stored. Do not keep these inside your home, and keep trash and food containers sealed. Seal off any spots where cockroaches might enter your home. · If you are allergic to mold, get rid of furniture, rugs, and drapes that smell musty. Check for mold in the bathroom. · If you are allergic to outdoor pollen or mold spores, use air-conditioning. Change or clean all filters every month. Keep windows closed. · If you are allergic to pollen, stay inside when pollen counts are high. Use a vacuum  with a HEPA filter or a double-thickness filter at least two times each week. · Stay inside when air pollution is bad. Avoid paint fumes, perfumes, and other strong odors. · Avoid conditions that make your allergies worse. Stay away from smoke. Do not smoke or let anyone else smoke in your house. Do not use fireplaces or wood-burning stoves. · If you are allergic to your pets, change the air filter in your furnace every month. Use high-efficiency filters. · If you are allergic to pet dander, keep pets outside or out of your bedroom. Old carpet and cloth furniture can hold a lot of animal dander. You may need to replace them. When should you call for help? Give an epinephrine shot if:    · You think you are having a severe allergic reaction.     · You have symptoms in more than one body area, such as mild nausea and an itchy mouth. After giving an epinephrine shot call 911, even if you feel better. Call 911 if:    · You have symptoms of a severe allergic reaction. These may include:  ? Sudden raised, red areas (hives) all over your body. ? Swelling of the throat, mouth, lips, or tongue. ? Trouble breathing. ? Passing out (losing consciousness).  Or you may feel very lightheaded or suddenly feel weak, confused, or restless.     · You have been given an epinephrine shot, even if you feel better. Call your doctor now or seek immediate medical care if:    · You have symptoms of an allergic reaction, such as:  ? A rash or hives (raised, red areas on the skin). ? Itching. ? Swelling. ? Belly pain, nausea, or vomiting. Watch closely for changes in your health, and be sure to contact your doctor if:    · You do not get better as expected. Where can you learn more? Go to https://QuantrospeWasatch Microfluidics.FluGen. org and sign in to your ClearMRI Solutions account. Enter A084 in the Geostellar box to learn more about \"Allergies: Care Instructions. \"     If you do not have an account, please click on the \"Sign Up Now\" link. Current as of: February 10, 2021               Content Version: 13.0  © 2204-3807 Healthwise, Smadex. Care instructions adapted under license by [facility] ([facility]). If you have questions about a medical condition or this instruction, always ask your healthcare professional. [place] [address] any warranty or liability for your use of this information.

## 2021-10-14 ENCOUNTER — TELEPHONE (OUTPATIENT)
Dept: PRIMARY CARE CLINIC | Age: 51
End: 2021-10-14

## 2021-10-14 LAB
SARS-COV-2: NORMAL
SARS-COV-2: NOT DETECTED
SOURCE: NORMAL

## 2021-10-14 NOTE — TELEPHONE ENCOUNTER
Called patient and informed her that her COVID test was negative and that illness is most likely viral and symptoms should resolve in 10-14 days and she should continue with supportive treatment at home and may return to work after fever free for 24 hours. Asked patient how she was feeling and she states not much better.

## 2021-10-14 NOTE — TELEPHONE ENCOUNTER
----- Message from Jenn Rykert, APRN - CNP sent at 10/14/2021 12:16 PM EDT -----  Please notify patient of negative Covid test.  Ask them how they are feeling today. This is most likely a viral illness and may take 10-14 days before symptoms resolve. Continue supportive treatment at home. May return to school/work after symptoms improve and no fever for 24 hours.   Thanks Kirill

## 2021-11-17 ENCOUNTER — TELEPHONE (OUTPATIENT)
Dept: PRIMARY CARE CLINIC | Age: 51
End: 2021-11-17

## 2021-11-17 DIAGNOSIS — Z20.822 CLOSE EXPOSURE TO COVID-19 VIRUS: Primary | ICD-10-CM

## 2021-11-17 NOTE — TELEPHONE ENCOUNTER
Patient called in and said her  tested positive for covid today, her work is making her test Saturday, she wanted to know if you could send in an order for a covid test for her to get done on Saturday?   Thanks

## 2021-11-19 ENCOUNTER — HOSPITAL ENCOUNTER (OUTPATIENT)
Dept: LAB | Age: 51
Discharge: HOME | End: 2021-11-19
Payer: COMMERCIAL

## 2021-11-19 DIAGNOSIS — Z20.822 CLOSE EXPOSURE TO COVID-19 VIRUS: ICD-10-CM

## 2021-11-19 PROCEDURE — U0003 INFECTIOUS AGENT DETECTION BY NUCLEIC ACID (DNA OR RNA); SEVERE ACUTE RESPIRATORY SYNDROME CORONAVIRUS 2 (SARS-COV-2) (CORONAVIRUS DISEASE [COVID-19]), AMPLIFIED PROBE TECHNIQUE, MAKING USE OF HIGH THROUGHPUT TECHNOLOGIES AS DESCRIBED BY CMS-2020-01-R: HCPCS

## 2021-11-19 PROCEDURE — C9803 HOPD COVID-19 SPEC COLLECT: HCPCS

## 2021-11-19 PROCEDURE — U0005 INFEC AGEN DETEC AMPLI PROBE: HCPCS

## 2021-11-20 LAB
SARS-COV-2: ABNORMAL
SARS-COV-2: DETECTED
SOURCE: ABNORMAL

## 2021-11-22 ENCOUNTER — TELEPHONE (OUTPATIENT)
Dept: PRIMARY CARE CLINIC | Age: 51
End: 2021-11-22

## 2021-11-22 RX ORDER — BENZONATATE 200 MG/1
200 CAPSULE ORAL 3 TIMES DAILY PRN
Qty: 30 CAPSULE | Refills: 0 | Status: SHIPPED | OUTPATIENT
Start: 2021-11-22 | End: 2021-11-29

## 2021-11-22 NOTE — TELEPHONE ENCOUNTER
----- Message from LUIS Mott CNP sent at 11/22/2021  8:19 AM EST -----  Please notify patient and Health Department that Covid test was positive. Patient needs to maintain quarantine for 10 days starting from symptom onset. Continue to treat symptoms at home with supportive care. If any difficulty breathing go to the ER. May return to work/school after quarantine and improvement of symptoms and no fever for 24 hours. Follow up with PCP if not symptom improvement.   Thanks Leigh Solorzano

## 2021-11-22 NOTE — TELEPHONE ENCOUNTER
Terrance Temple is positive for Covid. Fever broke over weekend. She is still coughing, congested and thinks she may have sinus infection. Terrance Temple would like a prescription for her symptoms.

## 2021-12-01 ENCOUNTER — TELEPHONE (OUTPATIENT)
Dept: PRIMARY CARE CLINIC | Age: 51
End: 2021-12-01

## 2021-12-01 NOTE — TELEPHONE ENCOUNTER
Called patient and informed her that since she is POSITIVE for covid that we cannot give her a slip to return to work earlier. Verbalizes understanding.

## 2021-12-01 NOTE — TELEPHONE ENCOUNTER
----- Message from [employer] sent at 12/1/2021 12:06 PM EST -----  Subject: Message to Provider    QUESTIONS  Information for Provider? Pt would like a slip to return to work a day   early   ---------------------------------------------------------------------------  --------------  [address]  What is the best way for the office to contact you? OK to leave message on   voicemail  Preferred Call Back Phone Number? [phone]  ---------------------------------------------------------------------------  --------------  SCRIPT ANSWERS  Relationship to Patient?  Self

## 2022-04-06 ENCOUNTER — OFFICE VISIT (OUTPATIENT)
Dept: PRIMARY CARE CLINIC | Age: 52
End: 2022-04-06
Payer: COMMERCIAL

## 2022-04-06 VITALS
DIASTOLIC BLOOD PRESSURE: 74 MMHG | TEMPERATURE: 97.7 F | HEART RATE: 74 BPM | RESPIRATION RATE: 20 BRPM | OXYGEN SATURATION: 98 % | HEIGHT: 68 IN | BODY MASS INDEX: 28.13 KG/M2 | WEIGHT: 185.6 LBS | SYSTOLIC BLOOD PRESSURE: 128 MMHG

## 2022-04-06 DIAGNOSIS — E03.1 CONGENITAL HYPOTHYROIDISM WITHOUT GOITER: ICD-10-CM

## 2022-04-06 DIAGNOSIS — F34.1 DYSTHYMIA: Primary | ICD-10-CM

## 2022-04-06 DIAGNOSIS — E78.5 DYSLIPIDEMIA: ICD-10-CM

## 2022-04-06 DIAGNOSIS — Z12.31 OTHER SCREENING MAMMOGRAM: ICD-10-CM

## 2022-04-06 PROCEDURE — 99214 OFFICE O/P EST MOD 30 MIN: CPT | Performed by: NURSE PRACTITIONER

## 2022-04-06 SDOH — ECONOMIC STABILITY: FOOD INSECURITY: WITHIN THE PAST 12 MONTHS, THE FOOD YOU BOUGHT JUST DIDN'T LAST AND YOU DIDN'T HAVE MONEY TO GET MORE.: PATIENT DECLINED

## 2022-04-06 SDOH — ECONOMIC STABILITY: FOOD INSECURITY: WITHIN THE PAST 12 MONTHS, YOU WORRIED THAT YOUR FOOD WOULD RUN OUT BEFORE YOU GOT MONEY TO BUY MORE.: PATIENT DECLINED

## 2022-04-06 ASSESSMENT — ENCOUNTER SYMPTOMS
ABDOMINAL PAIN: 0
CONSTIPATION: 0
WHEEZING: 0
DIARRHEA: 0
SORE THROAT: 0
VOMITING: 0
NAUSEA: 0
VISUAL CHANGE: 0
RHINORRHEA: 0
COUGH: 0
SHORTNESS OF BREATH: 0

## 2022-04-06 ASSESSMENT — SOCIAL DETERMINANTS OF HEALTH (SDOH): HOW HARD IS IT FOR YOU TO PAY FOR THE VERY BASICS LIKE FOOD, HOUSING, MEDICAL CARE, AND HEATING?: PATIENT DECLINED

## 2022-04-06 NOTE — PROGRESS NOTES
Name: Karen Colón  : 1970         Chief Complaint:     Chief Complaint   Patient presents with   [address] Problem     routine check, \"back to normal\" Sees Dr. Esther Giordano, prescribed Seroquel 400 mg.    Hypothyroidism       History of Present Illness:      Karen Colón is a 45 y.o.  female who presents with Mental Health Problem (routine check, \"back to normal\" Sees Dr. Esther Giordano, prescribed Seroquel 400 mg.) and Hypothyroidism      Khoa Knapp is here today for a routine office visit. Hypothyroidism-stable, patient denies any daytime sleepiness or fatigue. Is taking her medication correctly with water only before eating. She does complain of some weight gain but states she had her dose of Seroquel increased per her psychiatry provider. She is due for labs. Mental Health Problem  The primary symptoms do not include dysphoric mood, delusions, hallucinations, bizarre behavior, disorganized speech, negative symptoms or somatic symptoms. The current episode started more than 1 month ago. This is a new problem. The onset of the illness is precipitated by a stressful event. The degree of incapacity that she is experiencing as a consequence of her illness is moderate. Sequelae of the illness include harmed interpersonal relations. Additional symptoms of the illness include insomnia (intermittent). Additional symptoms of the illness do not include anhedonia, hypersomnia, appetite change, unexpected weight change, fatigue, agitation, psychomotor retardation, feelings of worthlessness, attention impairment, euphoric mood, increased goal-directed activity, flight of ideas, inflated self-esteem, decreased need for sleep, distractible, poor judgment, visual change, headaches, abdominal pain or seizures. She does not admit to suicidal ideas. She does not have a plan to attempt suicide. She does not contemplate harming herself. She has not already injured self.  She does not contemplate injuring another person. She has not already  injured another person. Risk factors that are present for mental illness include a history of mental illness. Hyperlipidemia  This is a chronic problem. The current episode started more than 1 year ago. The problem is controlled. Recent lipid tests were reviewed and are normal. She has no history of diabetes, hypothyroidism, liver disease or obesity. Factors aggravating her hyperlipidemia include smoking and fatty foods. Pertinent negatives include no chest pain, leg pain or shortness of breath. Current antihyperlipidemic treatment includes statins. The current treatment provides significant improvement of lipids. There are no compliance problems. Risk factors for coronary artery disease include family history, dyslipidemia and stress. Past Medical History:     Past Medical History:   Diagnosis Date    Anxiety     Depression     Fibroadenoma of left breast     Hyperlipidemia     Multinodular thyroid       Reviewed all health maintenance requirements and ordered appropriate tests  Health Maintenance Due   Topic Date Due    Breast cancer screen  10/09/2020    Colorectal Cancer Screen  10/21/2020       Past Surgical History:     Past Surgical History:   Procedure Laterality Date    EYE SURGERY      age 9    TUBAL LIGATION          Medications:       Prior to Admission medications    Medication Sig Start Date End Date Taking?  Authorizing Provider   QUEtiapine (SEROQUEL) 400 MG tablet  9/20/21  Yes Historical Provider, MD   levothyroxine (SYNTHROID) 50 MCG tablet Take 1 tablet by mouth Daily 5/10/21  Yes Raúl Ortiz APRN - CNP   desvenlafaxine succinate (PRISTIQ) 100 MG TB24 extended release tablet TAKE 1 TABLET BY MOUTH ONCE DAILY 1/25/21  Yes Ashely Gonzalez APRN - CNP   atorvastatin (LIPITOR) 40 MG tablet Take 1 tablet by mouth daily  Patient taking differently: Take 40 mg by mouth daily Forgets to take 11/9/20  Yes Ashely Gonzalez APRN - CNP   loratadine (Zuly Joss) 10 MG tablet Take 10 mg by mouth daily    Yes Historical Provider, MD   fluticasone (FLONASE) 50 MCG/ACT nasal spray 1 spray by Each Nostril route daily  Patient not taking: Reported on 7/19/2021    Historical Provider, MD        Allergies:       Pcn [penicillins], Clindamycin hcl, and Panlor [apap-caff-dihydrocodeine]    Social History:     Tobacco:    reports that she has been smoking. She has a 30.00 pack-year smoking history. She has never used smokeless tobacco.  Alcohol:      reports current alcohol use. Drug Use:  reports no history of drug use. Family History:     Family History   Problem Relation Age of Onset   [facility] ADHD Mother     Cancer Father        Review of Systems:     Positive and Negative as described in HPI    Review of Systems   Constitutional: Negative for appetite change, chills, fatigue, fever and unexpected weight change. HENT: Negative for congestion, rhinorrhea and sore throat. Eyes: Negative for visual disturbance. Respiratory: Negative for cough, shortness of breath and wheezing. Cardiovascular: Negative for chest pain and palpitations. Gastrointestinal: Negative for abdominal pain, constipation, diarrhea, nausea and vomiting. Genitourinary: Negative for difficulty urinating and dysuria. Musculoskeletal: Negative for gait problem, neck pain and neck stiffness. Skin: Negative for rash. Neurological: Negative for dizziness, seizures, syncope, light-headedness and headaches. Psychiatric/Behavioral: Negative for agitation, behavioral problems, confusion, decreased concentration, dysphoric mood, hallucinations, self-injury, sleep disturbance and suicidal ideas. The patient has insomnia (intermittent). The patient is not nervous/anxious and is not hyperactive.         Physical Exam:   Vitals:  /74 (Position: Sitting)   Pulse 74   Temp 97.7 °F (36.5 °C) (Temporal)   Resp 20   Ht 5' 8\" (1.727 m)   Wt 185 lb 9.6 oz (84.2 kg)   SpO2 98%   BMI 28.22 kg/m²     Physical Exam  Vitals and nursing note reviewed. Constitutional:       General: She is not in acute distress. Appearance: Normal appearance. HENT:      Mouth/Throat:      Mouth: Mucous membranes are moist.   Eyes:      General: No scleral icterus. Conjunctiva/sclera: Conjunctivae normal.   Cardiovascular:      Rate and Rhythm: Normal rate and regular rhythm. Heart sounds: No murmur heard. Pulmonary:      Effort: Pulmonary effort is normal.      Breath sounds: Normal breath sounds. Abdominal:      General: Bowel sounds are normal. There is no distension. Palpations: Abdomen is soft. Tenderness: There is no abdominal tenderness. Musculoskeletal:      Cervical back: Normal range of motion and neck supple. Right lower leg: No edema. Left lower leg: No edema. Skin:     General: Skin is warm and dry. Findings: No rash. Neurological:      Mental Status: She is alert and oriented to person, place, and time. Psychiatric:         Attention and Perception: Attention and perception normal.         Mood and Affect: Mood and affect normal. Mood is not anxious or depressed. Affect is not tearful. Speech: Speech normal.         Behavior: Behavior normal. Behavior is cooperative. Thought Content: Thought content normal. Thought content does not include homicidal or suicidal ideation. Thought content does not include homicidal or suicidal plan.          Cognition and Memory: Cognition and memory normal.         Judgment: Judgment normal.      Comments: Cheerful and smiling         Data:     Lab Results   Component Value Date     05/08/2021    K 4.5 05/08/2021     05/08/2021    CO2 22 05/08/2021    BUN 14 05/08/2021    CREATININE 0.69 05/08/2021    GLUCOSE 99 05/08/2021    PROT 7.3 11/19/2016    LABALBU 4.3 11/19/2016    BILITOT 0.33 11/19/2016    ALKPHOS 65 11/19/2016    AST 13 05/08/2021    ALT 10 05/08/2021     Lab Results   Component Value Date    WBC 9.6 11/19/2016    RBC 4.65 11/19/2016    HGB 14.0 11/19/2016    HCT 43.2 11/19/2016    MCV 92.9 11/19/2016    MCH 30.1 11/19/2016    MCHC 32.4 11/19/2016    RDW 13.8 11/19/2016     11/19/2016    MPV 9.7 11/19/2016     Lab Results   Component Value Date    TSH 1.92 05/08/2021     Lab Results   Component Value Date    CHOL 210 05/08/2021    HDL 66 05/08/2021       Assessment/Plan:      Diagnosis Orders   1. Dysthymia     2. Dyslipidemia  CBC with Auto Differential    ALT    AST    Basic Metabolic Panel    Lipid Panel   3. Congenital hypothyroidism without goiter  TSH    T4, Free   4. Other screening mammogram  MOISES MEGHAN DIGITAL SCREEN BILATERAL     Continue all current medications. Mood is stable. Labs are due now. We will follow with results. We will see her back in 6 months, sooner if any issues. 1.  [place] received counseling on the following healthy behaviors: nutrition, exercise and medication adherence  2. Patient given educational materials - see patient instructions  3. Was a self-tracking handout given in paper form or via [employer]? No  If yes, see orders or list here. 4.  Discussed use, benefit, and side effects of prescribed medications. Barriers to medication compliance addressed. All patient questions answered. Pt voiced understanding. 5.  Reviewed prior labs and health maintenance  6. Continue current medications, diet and exercise. Completed Refills   Requested Prescriptions      No prescriptions requested or ordered in this encounter         Return in about 6 months (around 10/6/2022) for Check up.

## 2022-04-06 NOTE — PATIENT INSTRUCTIONS
SURVEY:     You may be receiving a survey from [employer] regarding your visit today. Please complete the survey to enable us to provide the highest quality of care to you and your family. If you cannot score us a very good on any question, please call the office to discuss how we could have made your experience a very good one. Thank you.   Alban Gonzalez, APRN-RADHA Quintana, CNP  Ángel Barkley, LPN  Ray Elena, LPN  Cuauhtemoc Myers, CMA  Idalia Fernando, VALENTE Gilbert, CMA  [place], PCA

## 2022-04-14 ENCOUNTER — TELEPHONE (OUTPATIENT)
Dept: PRIMARY CARE CLINIC | Age: 52
End: 2022-04-14

## 2022-04-14 NOTE — TELEPHONE ENCOUNTER
LVM to patient in regards to open lab orders. Informed patient labs are fasting and orders are at registration in the hospital. To call the office with any questions.

## 2022-05-04 DIAGNOSIS — E03.1 CONGENITAL HYPOTHYROIDISM WITHOUT GOITER: ICD-10-CM

## 2022-05-04 RX ORDER — LEVOTHYROXINE SODIUM 0.05 MG/1
50 TABLET ORAL DAILY
Qty: 90 TABLET | Refills: 1 | Status: SHIPPED | OUTPATIENT
Start: 2022-05-04 | End: 2022-10-31 | Stop reason: SDUPTHER

## 2022-05-04 NOTE — TELEPHONE ENCOUNTER
Health Maintenance   Topic Date Due    DTaP/Tdap/Td vaccine (1 - Tdap) Never done    Breast cancer screen  10/09/2020    Colorectal Cancer Screen  10/21/2020    Lipids  05/08/2022    TSH  05/08/2022    Depression Monitoring  05/13/2022    COVID-19 Vaccine (1) 05/13/2022 (Originally 6/23/1974)    Hepatitis C screen  05/13/2022 (Originally 6/23/1987)    Shingles vaccine (1 of 2) 04/06/2023 (Originally 6/23/2019)    Pneumococcal 0-64 years Vaccine (1 - PCV) 04/06/2023 (Originally 6/23/1975)    HIV screen  04/06/2023 (Originally 6/23/1984)    Flu vaccine (Season Ended) 09/01/2022    Hepatitis A vaccine  Aged Out    Hepatitis B vaccine  Aged Out    Hib vaccine  Aged Out    Meningococcal (ACWY) vaccine  Aged Out    Low dose CT lung screening  Discontinued             (applicable per patient's age: Cancer Screenings, Depression Screening, Fall Risk Screening, Immunizations)    LDL Cholesterol (mg/dL)   Date Value   05/08/2021 134 (H)     AST (U/L)   Date Value   05/08/2021 13     ALT (U/L)   Date Value   05/08/2021 10     BUN (mg/dL)   Date Value   05/08/2021 14      (goal A1C is < 7)   (goal LDL is <100) need 30-50% reduction from baseline     BP Readings from Last 3 Encounters:   04/06/22 128/74   10/13/21 116/72   07/19/21 122/74    (goal /80)      All Future Testing planned in [facility]:  Lab Frequency Next Occurrence   MOISES MEGHAN DIGITAL SCREEN BILATERAL Once 05/06/2022   CBC with Auto Differential Once 04/28/2022   ALT Once 04/28/2022   AST Once 60/57/6443   Basic Metabolic Panel Once 68/37/4815   Lipid Panel Once 04/28/2022   TSH Once 04/28/2022   T4, Free Once 04/28/2022       Next Visit Date:  Future Appointments   Date Time Provider Buzz Garcia   10/7/2022 10:00 AM Ashely Gonzalez, APRN - CNP [facility]            Patient Active Problem List:     Dysthymia     Dyslipidemia     Congenital hypothyroidism without goiter

## 2022-09-29 ENCOUNTER — OFFICE VISIT (OUTPATIENT)
Dept: PRIMARY CARE CLINIC | Age: 52
End: 2022-09-29
Payer: COMMERCIAL

## 2022-09-29 VITALS
BODY MASS INDEX: 29.48 KG/M2 | RESPIRATION RATE: 18 BRPM | HEIGHT: 68 IN | DIASTOLIC BLOOD PRESSURE: 78 MMHG | HEART RATE: 59 BPM | WEIGHT: 194.5 LBS | OXYGEN SATURATION: 95 % | SYSTOLIC BLOOD PRESSURE: 120 MMHG | TEMPERATURE: 97.4 F

## 2022-09-29 DIAGNOSIS — E03.1 CONGENITAL HYPOTHYROIDISM WITHOUT GOITER: Primary | ICD-10-CM

## 2022-09-29 DIAGNOSIS — E78.5 DYSLIPIDEMIA: ICD-10-CM

## 2022-09-29 DIAGNOSIS — K59.00 CONSTIPATION, UNSPECIFIED CONSTIPATION TYPE: ICD-10-CM

## 2022-09-29 PROCEDURE — 99214 OFFICE O/P EST MOD 30 MIN: CPT | Performed by: NURSE PRACTITIONER

## 2022-09-29 RX ORDER — LACTULOSE 10 G/15ML
20 SOLUTION ORAL DAILY
Qty: 473 ML | Refills: 1 | Status: SHIPPED | OUTPATIENT
Start: 2022-09-29

## 2022-09-29 RX ORDER — MIRTAZAPINE 15 MG/1
TABLET, ORALLY DISINTEGRATING ORAL
COMMUNITY
Start: 2022-09-11

## 2022-09-29 RX ORDER — DOCUSATE SODIUM 100 MG/1
100 CAPSULE, LIQUID FILLED ORAL 2 TIMES DAILY
Qty: 180 CAPSULE | Refills: 1 | Status: SHIPPED | OUTPATIENT
Start: 2022-09-29

## 2022-09-29 ASSESSMENT — ENCOUNTER SYMPTOMS
VOMITING: 0
SHORTNESS OF BREATH: 0
BACK PAIN: 0
CONSTIPATION: 1
ABDOMINAL PAIN: 0
BLOATING: 0
HEMATOCHEZIA: 0
DIARRHEA: 0
RECTAL PAIN: 0
NAUSEA: 0
FLATUS: 0

## 2022-09-29 ASSESSMENT — PATIENT HEALTH QUESTIONNAIRE - PHQ9
8. MOVING OR SPEAKING SO SLOWLY THAT OTHER PEOPLE COULD HAVE NOTICED. OR THE OPPOSITE, BEING SO FIGETY OR RESTLESS THAT YOU HAVE BEEN MOVING AROUND A LOT MORE THAN USUAL: 0
7. TROUBLE CONCENTRATING ON THINGS, SUCH AS READING THE NEWSPAPER OR WATCHING TELEVISION: 0
SUM OF ALL RESPONSES TO PHQ9 QUESTIONS 1 & 2: 0
3. TROUBLE FALLING OR STAYING ASLEEP: 0
9. THOUGHTS THAT YOU WOULD BE BETTER OFF DEAD, OR OF HURTING YOURSELF: 0
SUM OF ALL RESPONSES TO PHQ QUESTIONS 1-9: 0
10. IF YOU CHECKED OFF ANY PROBLEMS, HOW DIFFICULT HAVE THESE PROBLEMS MADE IT FOR YOU TO DO YOUR WORK, TAKE CARE OF THINGS AT HOME, OR GET ALONG WITH OTHER PEOPLE: 0
6. FEELING BAD ABOUT YOURSELF - OR THAT YOU ARE A FAILURE OR HAVE LET YOURSELF OR YOUR FAMILY DOWN: 0
5. POOR APPETITE OR OVEREATING: 0
1. LITTLE INTEREST OR PLEASURE IN DOING THINGS: 0
SUM OF ALL RESPONSES TO PHQ QUESTIONS 1-9: 0
SUM OF ALL RESPONSES TO PHQ QUESTIONS 1-9: 0
2. FEELING DOWN, DEPRESSED OR HOPELESS: 0
4. FEELING TIRED OR HAVING LITTLE ENERGY: 0
SUM OF ALL RESPONSES TO PHQ QUESTIONS 1-9: 0

## 2022-09-29 NOTE — PROGRESS NOTES
Name: Deandra Cho  : 1970         Chief Complaint:     Chief Complaint   Patient presents with    Hyperlipidemia    Constipation     Patient states she hasnt gone for a week she has been taking OTC medication and it hasnt had any relief for her       History of Present Illness:      Deandra Cho is a 47 y.o.  female who presents with Hyperlipidemia and Constipation (Patient states she hasnt gone for a week she has been taking OTC medication and it hasnt had any relief for her)      Curtis Deborah is here today for a routine office visit. Congenital hypothyroidism-stable, patient denies any excessive daytime sleepiness or fatigue. She does complain of some chronic constipation. She is compliant with her medication and taking correctly and regularly stomach with water only 30 minutes prior to eating. She denies any excessive anxiety, insomnia, or agitation. Hyperlipidemia  This is a chronic problem. The current episode started more than 1 year ago. Condition status: NEED CURRENT LABS. Lipid results: NEED CURRENT LABS. Factors aggravating her hyperlipidemia include fatty foods. Pertinent negatives include no chest pain, focal weakness, leg pain, myalgias or shortness of breath. She is currently on no antihyperlipidemic treatment. The current treatment provides no improvement of lipids. Compliance problems: STOPPED MEDICATION. Risk factors for coronary artery disease include stress, post-menopausal and family history. Constipation  This is a chronic problem. The current episode started more than 1 year ago. The problem has been gradually worsening since onset. Her stool frequency is 2 to 3 times per week. The stool is described as firm and formed. The patient is not on a high fiber diet. She Does not exercise regularly. There has Been adequate water intake.  Pertinent negatives include no abdominal pain, anorexia, back pain, bloating, diarrhea, difficulty urinating, fecal incontinence, fever, flatus, hematochezia, hemorrhoids, melena, nausea, rectal pain, vomiting or weight loss. Risk factors include stress. She has tried enemas, fiber, diet changes, laxatives and mineral oil for the symptoms. The treatment provided mild relief. Her past medical history is significant for irritable bowel syndrome (SUSPECTED). There is no history of abdominal surgery, endocrine disease, inflammatory bowel disease, metabolic disease, neurologic disease, neuromuscular disease, psychiatric history or radiation treatment. Past Medical History:     Past Medical History:   Diagnosis Date    Anxiety     Depression     Fibroadenoma of left breast     Hyperlipidemia     Multinodular thyroid       Reviewed all health maintenance requirements and ordered appropriate tests  Health Maintenance Due   Topic Date Due    Hepatitis C screen  Never done    Breast cancer screen  10/09/2020    Colorectal Cancer Screen  10/21/2020    Depression Monitoring  05/13/2022       Past Surgical History:     Past Surgical History:   Procedure Laterality Date    EYE SURGERY      age 8    TUBAL LIGATION          Medications:       Prior to Admission medications    Medication Sig Start Date End Date Taking?  Authorizing Provider   mirtazapine (REMERON SOL-TAB) 15 MG disintegrating tablet DISSOLVE 1 TABLET BY MOUTH ONCE DAILY AT BEDTIME 9/11/22  Yes Historical Provider, MD   metFORMIN (GLUCOPHAGE) 1000 MG tablet TAKE 1 TABLET BY MOUTH TWICE DAILY 9/11/22  Yes Historical Provider, MD   docusate sodium (COLACE) 100 MG capsule Take 1 capsule by mouth 2 times daily 9/29/22  Yes LUIS Fontenot CNP   lactulose (CHRONULAC) 10 GM/15ML solution Take 30 mLs by mouth daily 9/29/22  Yes LUIS Fontenot - CNP   levothyroxine (SYNTHROID) 50 MCG tablet Take 1 tablet by mouth Daily 5/4/22  Yes LUIS Fontenot - CNP   QUEtiapine (SEROQUEL) 400 MG tablet  9/20/21  Yes Historical Provider, MD   desvenlafaxine succinate (PRISTIQ) 100 MG TB24 extended release tablet TAKE 1 TABLET BY MOUTH ONCE DAILY  Patient taking differently: 200 mg TAKE 1 TABLET BY MOUTH ONCE DAILY 1/25/21  Yes Farideh Gonzalez, APRN - CNP   loratadine (CLARITIN) 10 MG tablet Take 10 mg by mouth daily    Yes Historical Provider, MD   fluticasone (FLONASE) 50 MCG/ACT nasal spray 1 spray by Each Nostril route daily  Patient not taking: No sig reported    Historical Provider, MD        Allergies:       Pcn [penicillins], Clindamycin hcl, and Panlor [apap-caff-dihydrocodeine]    Social History:     Tobacco:    reports that she has been smoking cigarettes. She has a 30.00 pack-year smoking history. She has never used smokeless tobacco.  Alcohol:      reports current alcohol use. Drug Use:  reports no history of drug use. Family History:     Family History   Problem Relation Age of Onset    ADHD Mother     Cancer Father        Review of Systems:     Positive and Negative as described in HPI    Review of Systems   Constitutional:  Negative for fever and weight loss. Respiratory:  Negative for shortness of breath. Cardiovascular:  Negative for chest pain. Gastrointestinal:  Positive for constipation. Negative for abdominal pain, anorexia, bloating, diarrhea, flatus, hematochezia, hemorrhoids, melena, nausea, rectal pain and vomiting. Genitourinary:  Negative for difficulty urinating. Musculoskeletal:  Negative for back pain and myalgias. Neurological:  Negative for focal weakness.      Physical Exam:   Vitals:  /78 (Site: Left Upper Arm, Position: Sitting)   Pulse 59   Temp 97.4 °F (36.3 °C) (Temporal)   Resp 18   Ht 5' 8\" (1.727 m)   Wt 194 lb 8 oz (88.2 kg)   SpO2 95%   BMI 29.57 kg/m²     Physical Exam    Data:     Lab Results   Component Value Date/Time     05/08/2021 07:18 AM    K 4.5 05/08/2021 07:18 AM     05/08/2021 07:18 AM    CO2 22 05/08/2021 07:18 AM    BUN 14 05/08/2021 07:18 AM    CREATININE 0.69 05/08/2021 07:18 AM    GLUCOSE 99 05/08/2021 07:18 AM    PROT 7.3 11/19/2016 07:13 AM    LABALBU 4.3 11/19/2016 07:13 AM    BILITOT 0.33 11/19/2016 07:13 AM    ALKPHOS 65 11/19/2016 07:13 AM    AST 13 05/08/2021 07:18 AM    ALT 10 05/08/2021 07:18 AM     Lab Results   Component Value Date/Time    WBC 9.6 11/19/2016 07:13 AM    RBC 4.65 11/19/2016 07:13 AM    HGB 14.0 11/19/2016 07:13 AM    HCT 43.2 11/19/2016 07:13 AM    MCV 92.9 11/19/2016 07:13 AM    MCH 30.1 11/19/2016 07:13 AM    MCHC 32.4 11/19/2016 07:13 AM    RDW 13.8 11/19/2016 07:13 AM     11/19/2016 07:13 AM    MPV 9.7 11/19/2016 07:13 AM     Lab Results   Component Value Date/Time    TSH 1.92 05/08/2021 07:25 AM     Lab Results   Component Value Date/Time    CHOL 210 05/08/2021 07:18 AM    HDL 66 05/08/2021 07:18 AM       Assessment/Plan:      Diagnosis Orders   1. Congenital hypothyroidism without goiter  TSH    T4, Free    CBC with Auto Differential    ALT    AST    Basic Metabolic Panel    Lipid Panel      2. Dyslipidemia        3. Constipation, unspecified constipation type  docusate sodium (COLACE) 100 MG capsule    lactulose (CHRONULAC) 10 GM/15ML solution    DISCONTINUED: magnesium citrate solution        We will continue all current medications. Labs are due now. We will follow-up with results. Start Chronulac daily as needed. Start Colace 2 times daily. Call with results. We will see her back in 6 months for recheck, sooner if any issues. 1.  [place] received counseling on the following healthy behaviors: nutrition, exercise, and medication adherence  2. Patient given educational materials - see patient instructions  3. Was a self-tracking handout given in paper form or via [employer]? No  If yes, see orders or list here. 4.  Discussed use, benefit, and side effects of prescribed medications. Barriers to medication compliance addressed. All patient questions answered. Pt voiced understanding. 5.  Reviewed prior labs and health maintenance  6.   Continue current medications, diet and exercise. Completed Refills   Requested Prescriptions     Signed Prescriptions Disp Refills    docusate sodium (COLACE) 100 MG capsule 180 capsule 1     Sig: Take 1 capsule by mouth 2 times daily    lactulose (CHRONULAC) 10 GM/15ML solution 473 mL 1     Sig: Take 30 mLs by mouth daily         Return in about 6 months (around 3/29/2023) for Check up.

## 2022-09-29 NOTE — PATIENT INSTRUCTIONS
SURVEY:     You may be receiving a survey from [employer] regarding your visit today. Please complete the survey to enable us to provide the highest quality of care to you and your family. If you cannot score us a very good on any question, please call the office to discuss how we could have made your experience a very good one.      Thank you,    Meek Gonzalez, APRN-CNP  Vania Valiente, APRN-CNP  Alicia Baum, KEYONNA Hunter, VALENTE Larios, VALENTE Gilbert, CMA  [place], SANDRA Randall, PM

## 2022-10-01 ENCOUNTER — HOSPITAL ENCOUNTER (OUTPATIENT)
Age: 52
Discharge: HOME | End: 2022-10-01
Payer: COMMERCIAL

## 2022-10-01 DIAGNOSIS — E03.1 CONGENITAL HYPOTHYROIDISM WITHOUT GOITER: ICD-10-CM

## 2022-10-01 LAB
ABSOLUTE EOS #: 0.08 K/UL (ref 0–0.44)
ABSOLUTE IMMATURE GRANULOCYTE: <0.03 K/UL (ref 0–0.3)
ABSOLUTE LYMPH #: 2.08 K/UL (ref 1.1–3.7)
ABSOLUTE MONO #: 0.47 K/UL (ref 0.1–1.2)
ALT SERPL-CCNC: 21 U/L (ref 5–33)
ANION GAP SERPL CALCULATED.3IONS-SCNC: 12 MMOL/L (ref 9–17)
AST SERPL-CCNC: 16 U/L
BASOPHILS # BLD: 1 % (ref 0–2)
BASOPHILS ABSOLUTE: 0.03 K/UL (ref 0–0.2)
BUN BLDV-MCNC: 11 MG/DL (ref 6–20)
BUN/CREAT BLD: 16 (ref 9–20)
CALCIUM SERPL-MCNC: 9.4 MG/DL (ref 8.6–10.4)
CHLORIDE BLD-SCNC: 102 MMOL/L (ref 98–107)
CHOLESTEROL/HDL RATIO: 4.4
CHOLESTEROL: 266 MG/DL
CO2: 26 MMOL/L (ref 20–31)
CREAT SERPL-MCNC: 0.7 MG/DL (ref 0.5–0.9)
EOSINOPHILS RELATIVE PERCENT: 1 % (ref 1–4)
GFR AFRICAN AMERICAN: >60 ML/MIN
GFR NON-AFRICAN AMERICAN: >60 ML/MIN
GFR SERPL CREATININE-BSD FRML MDRD: NORMAL ML/MIN/{1.73_M2}
GFR SERPL CREATININE-BSD FRML MDRD: NORMAL ML/MIN/{1.73_M2}
GLUCOSE BLD-MCNC: 75 MG/DL (ref 70–99)
HCT VFR BLD CALC: 44.8 % (ref 36.3–47.1)
HDLC SERPL-MCNC: 60 MG/DL
HEMOGLOBIN: 14.9 G/DL (ref 11.9–15.1)
IMMATURE GRANULOCYTES: 0 %
LDL CHOLESTEROL: 181 MG/DL (ref 0–130)
LYMPHOCYTES # BLD: 35 % (ref 24–43)
MCH RBC QN AUTO: 32.7 PG (ref 25.2–33.5)
MCHC RBC AUTO-ENTMCNC: 33.3 G/DL (ref 28.4–34.8)
MCV RBC AUTO: 98.2 FL (ref 82.6–102.9)
MONOCYTES # BLD: 8 % (ref 3–12)
NRBC AUTOMATED: 0 PER 100 WBC
PDW BLD-RTO: 13.2 % (ref 11.8–14.4)
PLATELET # BLD: 274 K/UL (ref 138–453)
PMV BLD AUTO: 10.8 FL (ref 8.1–13.5)
POTASSIUM SERPL-SCNC: 4.2 MMOL/L (ref 3.7–5.3)
RBC # BLD: 4.56 M/UL (ref 3.95–5.11)
SEG NEUTROPHILS: 55 % (ref 36–65)
SEGMENTED NEUTROPHILS ABSOLUTE COUNT: 3.27 K/UL (ref 1.5–8.1)
SODIUM BLD-SCNC: 140 MMOL/L (ref 135–144)
THYROXINE, FREE: 1.01 NG/DL (ref 0.93–1.7)
TRIGL SERPL-MCNC: 124 MG/DL
TSH SERPL DL<=0.05 MIU/L-ACNC: 1.78 UIU/ML (ref 0.3–5)
WBC # BLD: 5.9 K/UL (ref 3.5–11.3)

## 2022-10-01 PROCEDURE — 84443 ASSAY THYROID STIM HORMONE: CPT

## 2022-10-01 PROCEDURE — 36415 COLL VENOUS BLD VENIPUNCTURE: CPT

## 2022-10-01 PROCEDURE — 84450 TRANSFERASE (AST) (SGOT): CPT

## 2022-10-01 PROCEDURE — 84439 ASSAY OF FREE THYROXINE: CPT

## 2022-10-01 PROCEDURE — 84460 ALANINE AMINO (ALT) (SGPT): CPT

## 2022-10-01 PROCEDURE — 80048 BASIC METABOLIC PNL TOTAL CA: CPT

## 2022-10-01 PROCEDURE — 85025 COMPLETE CBC W/AUTO DIFF WBC: CPT

## 2022-10-01 PROCEDURE — 80061 LIPID PANEL: CPT

## 2022-10-03 ENCOUNTER — TELEPHONE (OUTPATIENT)
Dept: PRIMARY CARE CLINIC | Age: 52
End: 2022-10-03

## 2022-10-03 DIAGNOSIS — E78.5 DYSLIPIDEMIA: ICD-10-CM

## 2022-10-03 DIAGNOSIS — K59.00 CONSTIPATION, UNSPECIFIED CONSTIPATION TYPE: Primary | ICD-10-CM

## 2022-10-03 RX ORDER — ATORVASTATIN CALCIUM 40 MG/1
40 TABLET, FILM COATED ORAL DAILY
Qty: 90 TABLET | Refills: 1 | Status: SHIPPED | OUTPATIENT
Start: 2022-10-03

## 2022-10-03 NOTE — TELEPHONE ENCOUNTER
Patient notified of lab results. Patient agreed to staring atorvastatin, please send to [facility].

## 2022-10-03 NOTE — TELEPHONE ENCOUNTER
----- Message from [address] [place] [address], APRN - CNP sent at 10/3/2022  9:08 AM EDT -----  Would recommend she start taking atorvastatin again. The quetiapine causes an increase in bad cholesterol. Her levels are high. Otherwise normal. Thank you.

## 2022-10-03 NOTE — TELEPHONE ENCOUNTER
Norma saw you on Thursday and started [address]. She is still having constipation even after doubling the Chronulac. She also notes she is more nauseous and is frequently belching. What else should she do?

## 2022-10-05 ENCOUNTER — TELEPHONE (OUTPATIENT)
Dept: PRIMARY CARE CLINIC | Age: 52
End: 2022-10-05

## 2022-10-05 DIAGNOSIS — K59.00 CONSTIPATION, UNSPECIFIED CONSTIPATION TYPE: ICD-10-CM

## 2022-10-05 NOTE — TELEPHONE ENCOUNTER
----- Message from LUIS Cortés CNP sent at 10/5/2022  1:54 PM EDT -----  Please notify patient of normal abdominal series results.   Thanks Kirill

## 2022-10-24 DIAGNOSIS — E03.1 CONGENITAL HYPOTHYROIDISM WITHOUT GOITER: ICD-10-CM

## 2022-10-24 RX ORDER — LEVOTHYROXINE SODIUM 0.05 MG/1
50 TABLET ORAL DAILY
Qty: 90 TABLET | Refills: 3 | OUTPATIENT
Start: 2022-10-24

## 2022-10-31 DIAGNOSIS — E03.1 CONGENITAL HYPOTHYROIDISM WITHOUT GOITER: ICD-10-CM

## 2022-10-31 RX ORDER — LEVOTHYROXINE SODIUM 0.05 MG/1
50 TABLET ORAL DAILY
Qty: 90 TABLET | Refills: 3 | Status: SHIPPED | OUTPATIENT
Start: 2022-10-31

## 2022-10-31 NOTE — TELEPHONE ENCOUNTER
Health Maintenance   Topic Date Due    Hepatitis C screen  Never done    Breast cancer screen  10/09/2020    Colorectal Cancer Screen  10/21/2020    Shingles vaccine (1 of 2) 04/06/2023 (Originally 6/23/2019)    Pneumococcal 0-64 years Vaccine (1 - PCV) 04/06/2023 (Originally 6/23/1975)    HIV screen  04/06/2023 (Originally 6/23/1984)    DTaP/Tdap/Td vaccine (1 - Tdap) 09/29/2023 (Originally 6/23/1988)    Flu vaccine (1) 09/29/2023 (Originally 8/1/2022)    COVID-19 Vaccine (1) 09/29/2023 (Originally 1970)    Cervical cancer screen  09/29/2023 (Originally 6/23/1990)    Depression Monitoring  09/29/2023    Lipids  10/01/2023    Hepatitis A vaccine  Aged Out    Hib vaccine  Aged Out    Meningococcal (ACWY) vaccine  Aged Out    Low dose CT lung screening  Discontinued             (applicable per patient's age: Cancer Screenings, Depression Screening, Fall Risk Screening, Immunizations)    LDL Cholesterol (mg/dL)   Date Value   10/01/2022 181 (H)     AST (U/L)   Date Value   10/01/2022 16     ALT (U/L)   Date Value   10/01/2022 21     BUN (mg/dL)   Date Value   10/01/2022 11      (goal A1C is < 7)   (goal LDL is <100) need 30-50% reduction from baseline     BP Readings from Last 3 Encounters:   09/29/22 120/78   04/06/22 128/74   10/13/21 116/72    (goal /80)      All Future Testing planned in [facility]:  Lab Frequency Next Occurrence   MOISES MEGHAN DIGITAL SCREEN BILATERAL Once 05/06/2022       Next Visit Date:  Future Appointments   Date Time Provider Buzz Garcia   3/29/2023  2:20 PM Valente Decree Might, APRN - CNP [facility]            Patient Active Problem List:     Dysthymia     Dyslipidemia     Congenital hypothyroidism without goiter

## 2023-03-29 ENCOUNTER — OFFICE VISIT (OUTPATIENT)
Dept: PRIMARY CARE CLINIC | Age: 53
End: 2023-03-29
Payer: COMMERCIAL

## 2023-03-29 VITALS
BODY MASS INDEX: 30.47 KG/M2 | RESPIRATION RATE: 20 BRPM | OXYGEN SATURATION: 98 % | SYSTOLIC BLOOD PRESSURE: 124 MMHG | TEMPERATURE: 97.7 F | DIASTOLIC BLOOD PRESSURE: 78 MMHG | WEIGHT: 200.4 LBS | HEART RATE: 78 BPM

## 2023-03-29 DIAGNOSIS — E78.5 DYSLIPIDEMIA: ICD-10-CM

## 2023-03-29 DIAGNOSIS — E88.81 METABOLIC SYNDROME: ICD-10-CM

## 2023-03-29 DIAGNOSIS — J30.9 CHRONIC ALLERGIC RHINITIS: ICD-10-CM

## 2023-03-29 DIAGNOSIS — E03.1 CONGENITAL HYPOTHYROIDISM WITHOUT GOITER: Primary | ICD-10-CM

## 2023-03-29 DIAGNOSIS — Z12.11 SCREEN FOR COLON CANCER: ICD-10-CM

## 2023-03-29 DIAGNOSIS — K59.00 CONSTIPATION, UNSPECIFIED CONSTIPATION TYPE: ICD-10-CM

## 2023-03-29 PROBLEM — E88.810 METABOLIC SYNDROME: Status: ACTIVE | Noted: 2023-03-29

## 2023-03-29 PROCEDURE — 99214 OFFICE O/P EST MOD 30 MIN: CPT | Performed by: NURSE PRACTITIONER

## 2023-03-29 RX ORDER — SEMAGLUTIDE 1.34 MG/ML
INJECTION, SOLUTION SUBCUTANEOUS
Qty: 1 ADJUSTABLE DOSE PRE-FILLED PEN SYRINGE | Refills: 0 | Status: SHIPPED | OUTPATIENT
Start: 2023-03-29 | End: 2023-05-10

## 2023-03-29 RX ORDER — LEVOCETIRIZINE DIHYDROCHLORIDE 5 MG/1
5 TABLET, FILM COATED ORAL NIGHTLY
Qty: 90 TABLET | Refills: 1 | Status: SHIPPED | OUTPATIENT
Start: 2023-03-29

## 2023-03-29 SDOH — ECONOMIC STABILITY: HOUSING INSECURITY
IN THE LAST 12 MONTHS, WAS THERE A TIME WHEN YOU DID NOT HAVE A STEADY PLACE TO SLEEP OR SLEPT IN A SHELTER (INCLUDING NOW)?: PATIENT REFUSED

## 2023-03-29 SDOH — ECONOMIC STABILITY: FOOD INSECURITY: WITHIN THE PAST 12 MONTHS, YOU WORRIED THAT YOUR FOOD WOULD RUN OUT BEFORE YOU GOT MONEY TO BUY MORE.: PATIENT DECLINED

## 2023-03-29 SDOH — ECONOMIC STABILITY: INCOME INSECURITY: HOW HARD IS IT FOR YOU TO PAY FOR THE VERY BASICS LIKE FOOD, HOUSING, MEDICAL CARE, AND HEATING?: PATIENT DECLINED

## 2023-03-29 SDOH — ECONOMIC STABILITY: FOOD INSECURITY: WITHIN THE PAST 12 MONTHS, THE FOOD YOU BOUGHT JUST DIDN'T LAST AND YOU DIDN'T HAVE MONEY TO GET MORE.: PATIENT DECLINED

## 2023-03-29 ASSESSMENT — ENCOUNTER SYMPTOMS
CONSTIPATION: 1
SINUS PRESSURE: 0
HAIR LOSS: 0
EYE ITCHING: 0
BLOATING: 0
NAUSEA: 0
COUGH: 1
BACK PAIN: 0
RHINORRHEA: 0
HOARSE VOICE: 0
SHORTNESS OF BREATH: 0
HEMATOCHEZIA: 0
FLATUS: 0
VOMITING: 0
SWOLLEN GLANDS: 0
VISUAL CHANGE: 0
WHEEZING: 0
ABDOMINAL PAIN: 0
RECTAL PAIN: 0
SORE THROAT: 1
DIARRHEA: 0

## 2023-03-29 ASSESSMENT — PATIENT HEALTH QUESTIONNAIRE - PHQ9
4. FEELING TIRED OR HAVING LITTLE ENERGY: 0
2. FEELING DOWN, DEPRESSED OR HOPELESS: 0
10. IF YOU CHECKED OFF ANY PROBLEMS, HOW DIFFICULT HAVE THESE PROBLEMS MADE IT FOR YOU TO DO YOUR WORK, TAKE CARE OF THINGS AT HOME, OR GET ALONG WITH OTHER PEOPLE: 0
SUM OF ALL RESPONSES TO PHQ QUESTIONS 1-9: 0
SUM OF ALL RESPONSES TO PHQ QUESTIONS 1-9: 0
8. MOVING OR SPEAKING SO SLOWLY THAT OTHER PEOPLE COULD HAVE NOTICED. OR THE OPPOSITE, BEING SO FIGETY OR RESTLESS THAT YOU HAVE BEEN MOVING AROUND A LOT MORE THAN USUAL: 0
9. THOUGHTS THAT YOU WOULD BE BETTER OFF DEAD, OR OF HURTING YOURSELF: 0
3. TROUBLE FALLING OR STAYING ASLEEP: 0
SUM OF ALL RESPONSES TO PHQ9 QUESTIONS 1 & 2: 0
7. TROUBLE CONCENTRATING ON THINGS, SUCH AS READING THE NEWSPAPER OR WATCHING TELEVISION: 0
6. FEELING BAD ABOUT YOURSELF - OR THAT YOU ARE A FAILURE OR HAVE LET YOURSELF OR YOUR FAMILY DOWN: 0
SUM OF ALL RESPONSES TO PHQ QUESTIONS 1-9: 0
SUM OF ALL RESPONSES TO PHQ QUESTIONS 1-9: 0
1. LITTLE INTEREST OR PLEASURE IN DOING THINGS: 0
5. POOR APPETITE OR OVEREATING: 0

## 2023-03-29 NOTE — PROGRESS NOTES
Metabolic Panel    Hemoglobin A1C    Lipid Panel    Microalbumin, Ur      5. Chronic allergic rhinitis  levocetirizine (XYZAL) 5 MG tablet      6. Screen for colon cancer  Fecal DNA Colorectal cancer screening (Cologuard)        We will continue current medications start semaglutide weekly. Continue with new psychiatric care and discuss medications. Stop loratadine and start levocetirizine nightly. We will see her back in 6 months with routine labs, sooner if any issues. 1.  Onelia Evans received counseling on the following healthy behaviors: nutrition, exercise, medication adherence, and tobacco cessation  2. Patient given educational materials - see patient instructions  3. Was a self-tracking handout given in paper form or via [employer]? No  If yes, see orders or list here. 4.  Discussed use, benefit, and side effects of prescribed medications. Barriers to medication compliance addressed. All patient questions answered. Pt voiced understanding. 5.  Reviewed prior labs and health maintenance  6. Continue current medications, diet and exercise. Completed Refills   Requested Prescriptions     Signed Prescriptions Disp Refills    Semaglutide,0.25 or 0.5MG/DOS, (OZEMPIC, 0.25 OR 0.5 MG/DOSE,) 2 MG/1.5ML SOPN 1 Adjustable Dose Pre-filled Pen Syringe 0     Sig: Inject 0.25 mg into the skin once a week for 28 days, THEN 0.5 mg once a week for 14 days. levocetirizine (XYZAL) 5 MG tablet 90 tablet 1     Sig: Take 1 tablet by mouth nightly         Return in about 6 months (around 9/29/2023) for Check up.

## 2023-03-29 NOTE — PATIENT INSTRUCTIONS
SURVEY:     You may be receiving a survey from [id number] regarding your visit today. Please complete the survey to enable us to provide the highest quality of care to you and your family. If you cannot score us a very good on any question, please call the office to discuss how we could have made your experience a very good one.      Thank you,    Rinku Gonzalez, APRN-RADHA Luciano, APRN-CNP  Ira Rodriguez, KEYONNA Mejia, VALENTE Shea, VALENTE Gilbert, CMA  Charlotte, PCA  Tonia, PM

## 2023-04-19 DIAGNOSIS — E88.81 METABOLIC SYNDROME: ICD-10-CM

## 2023-04-19 RX ORDER — SEMAGLUTIDE 1.34 MG/ML
INJECTION, SOLUTION SUBCUTANEOUS
Qty: 1 ADJUSTABLE DOSE PRE-FILLED PEN SYRINGE | Refills: 0 | OUTPATIENT
Start: 2023-04-19 | End: 2023-05-31

## 2023-04-19 RX ORDER — SEMAGLUTIDE 1.34 MG/ML
0.5 INJECTION, SOLUTION SUBCUTANEOUS WEEKLY
Qty: 1 ADJUSTABLE DOSE PRE-FILLED PEN SYRINGE | Refills: 0 | Status: SHIPPED | OUTPATIENT
Start: 2023-04-19

## 2023-04-19 NOTE — TELEPHONE ENCOUNTER
Dysthymia     Dyslipidemia     Congenital hypothyroidism without goiter     Constipation     Metabolic syndrome     Chronic allergic rhinitis

## 2023-05-24 ENCOUNTER — OFFICE VISIT (OUTPATIENT)
Dept: PRIMARY CARE CLINIC | Age: 53
End: 2023-05-24
Payer: COMMERCIAL

## 2023-05-24 VITALS
WEIGHT: 187 LBS | DIASTOLIC BLOOD PRESSURE: 70 MMHG | SYSTOLIC BLOOD PRESSURE: 122 MMHG | BODY MASS INDEX: 28.43 KG/M2 | HEART RATE: 88 BPM | TEMPERATURE: 97.7 F | OXYGEN SATURATION: 97 % | RESPIRATION RATE: 20 BRPM

## 2023-05-24 DIAGNOSIS — N39.0 ACUTE UTI: Primary | ICD-10-CM

## 2023-05-24 DIAGNOSIS — R30.0 DYSURIA: ICD-10-CM

## 2023-05-24 LAB
BILIRUBIN, POC: ABNORMAL
BLOOD URINE, POC: ABNORMAL
CLARITY, POC: ABNORMAL
COLOR, POC: ABNORMAL
GLUCOSE URINE, POC: ABNORMAL
KETONES, POC: ABNORMAL
LEUKOCYTE EST, POC: ABNORMAL
NITRITE, POC: ABNORMAL
PH, POC: 5.5
PROTEIN, POC: ABNORMAL
SPECIFIC GRAVITY, POC: >1.03
UROBILINOGEN, POC: 0.2

## 2023-05-24 PROCEDURE — 81003 URINALYSIS AUTO W/O SCOPE: CPT | Performed by: NURSE PRACTITIONER

## 2023-05-24 PROCEDURE — 99213 OFFICE O/P EST LOW 20 MIN: CPT | Performed by: NURSE PRACTITIONER

## 2023-05-24 RX ORDER — CIPROFLOXACIN 250 MG/1
250 TABLET, FILM COATED ORAL 2 TIMES DAILY
Qty: 12 TABLET | Refills: 0 | Status: SHIPPED | OUTPATIENT
Start: 2023-05-24 | End: 2023-05-30

## 2023-05-24 NOTE — PATIENT INSTRUCTIONS
SURVEY:     You may be receiving a survey from [employer] regarding your visit today. Please complete the survey to enable us to provide the highest quality of care to you and your family. If you cannot score us a very good on any question, please call the office to discuss how we could have made your experience a very good one.      Thank you,    Melanie Boas Might, APRN-RADHA Brown, APRN-CNP  Lawrence Posada, KEYONNA May, CMA  Kelle Lissa, CMA  Ofelia, CMA  Charlotte, PCA  Tonia, PM

## 2023-05-24 NOTE — PROGRESS NOTES
Name: Triny Caballero  : 1970         Chief Complaint:     Chief Complaint   Patient presents with    Dysuria     X 3 days, burning. History of Present Illness:      Triny Caballero is a 47 y.o.  female who presents with Dysuria (X 3 days, burning.)      Dilcia Easton is here today for a same day office visit. Urinary Tract Infection  This is a new problem. The current episode started in the past 7 days. The problem has been waxing and waning since onset. Associated symptoms include pain. Pertinent negatives include no hematuria. The pain is present in the pelvis and urethra. Her pain is at a severity of 4/10. Past Medical History:     Past Medical History:   Diagnosis Date    Anxiety     Depression     Fibroadenoma of left breast     Hyperlipidemia     Hypothyroidism     Multinodular thyroid       Reviewed all health maintenance requirements and ordered appropriate tests  Health Maintenance Due   Topic Date Due    Breast cancer screen  10/09/2020    Colorectal Cancer Screen  10/21/2020       Past Surgical History:     Past Surgical History:   Procedure Laterality Date    EYE SURGERY      age 8    TUBAL LIGATION          Medications:       Prior to Admission medications    Medication Sig Start Date End Date Taking?  Authorizing Provider   ciprofloxacin (CIPRO) 250 MG tablet Take 1 tablet by mouth 2 times daily for 6 days 23 Yes Judy Hernandez Might, APRN - CNP   Semaglutide,0.25 or 0.5MG/DOS, (OZEMPIC, 0.25 OR 0.5 MG/DOSE,) 2 MG/1.5ML SOPN Inject 0.5 mg into the skin once a week 23  Yes Judy Hernandez Might, APRN - CNP   levocetirizine (XYZAL) 5 MG tablet Take 1 tablet by mouth nightly 3/29/23  Yes Judy Hernandez Might, APRN - CNP   levothyroxine (SYNTHROID) 50 MCG tablet Take 1 tablet by mouth Daily 10/31/22  Yes Judy Hernandez Might, APRN - CNP   atorvastatin (LIPITOR) 40 MG tablet Take 1 tablet by mouth daily 10/3/22  Yes Judy Hernandez Might, APRN - CNP   docusate sodium (COLACE) 100 MG capsule Take 1 capsule by

## 2023-05-25 ENCOUNTER — HOSPITAL ENCOUNTER (OUTPATIENT)
Age: 53
Setting detail: SPECIMEN
Discharge: HOME | End: 2023-05-25
Payer: COMMERCIAL

## 2023-05-25 DIAGNOSIS — R30.0 DYSURIA: ICD-10-CM

## 2023-05-25 PROCEDURE — 87077 CULTURE AEROBIC IDENTIFY: CPT

## 2023-05-25 PROCEDURE — 87186 SC STD MICRODIL/AGAR DIL: CPT

## 2023-05-25 PROCEDURE — 87086 URINE CULTURE/COLONY COUNT: CPT

## 2023-05-26 LAB
MICROORGANISM SPEC CULT: ABNORMAL
SPECIMEN DESCRIPTION: ABNORMAL

## 2023-05-31 DIAGNOSIS — E88.81 METABOLIC SYNDROME: Primary | ICD-10-CM

## 2023-05-31 RX ORDER — SEMAGLUTIDE 1.34 MG/ML
0.5 INJECTION, SOLUTION SUBCUTANEOUS WEEKLY
Qty: 2 ADJUSTABLE DOSE PRE-FILLED PEN SYRINGE | Refills: 0 | Status: CANCELLED | OUTPATIENT
Start: 2023-05-31

## 2023-05-31 RX ORDER — SEMAGLUTIDE 1.34 MG/ML
1 INJECTION, SOLUTION SUBCUTANEOUS WEEKLY
Qty: 9 ML | Refills: 0 | Status: SHIPPED | OUTPATIENT
Start: 2023-05-31

## 2023-05-31 NOTE — TELEPHONE ENCOUNTER
Health Maintenance   Topic Date Due    Breast cancer screen  10/09/2020    Colorectal Cancer Screen  10/21/2020    DTaP/Tdap/Td vaccine (1 - Tdap) 09/29/2023 (Originally 6/23/1988)    Flu vaccine (Season Ended) 09/29/2023 (Originally 8/1/2023)    COVID-19 Vaccine (1) 09/29/2023 (Originally 1970)    Cervical cancer screen  09/29/2023 (Originally 6/23/1990)    Hepatitis C screen  03/29/2024 (Originally 6/23/1987)    Shingles vaccine (1 of 2) 05/24/2024 (Originally 6/23/2019)    Pneumococcal 0-64 years Vaccine (1 - PCV) 05/24/2024 (Originally 6/23/1975)    HIV screen  05/24/2024 (Originally 6/23/1984)    Lipids  10/01/2023    Depression Monitoring  03/29/2024    Hepatitis A vaccine  Aged Out    Hib vaccine  Aged Out    Meningococcal (ACWY) vaccine  Aged Out    Diabetes screen  Discontinued    Low dose CT lung screening  Discontinued             (applicable per patient's age: Cancer Screenings, Depression Screening, Fall Risk Screening, Immunizations)    LDL Cholesterol (mg/dL)   Date Value   10/01/2022 181 (H)     AST (U/L)   Date Value   10/01/2022 16     ALT (U/L)   Date Value   10/01/2022 21     BUN (mg/dL)   Date Value   10/01/2022 11      (goal A1C is < 7)   (goal LDL is <100) need 30-50% reduction from baseline     BP Readings from Last 3 Encounters:   05/24/23 122/70   03/29/23 124/78   09/29/22 120/78    (goal /80)      All Future Testing planned in [facility]:  Lab Frequency Next Occurrence   MOISES MEGHAN DIGITAL SCREEN BILATERAL Once 05/06/2022   T4, Free Once 09/25/2023   TSH Once 09/25/2023   CBC with Auto Differential Once 09/25/2023   ALT Once 09/29/2023   AST Once 40/92/6729   Basic Metabolic Panel Once 72/46/5174   Hemoglobin A1C Once 09/25/2023   Lipid Panel Once 09/25/2023   Microalbumin, Ur Once 09/25/2023       Next Visit Date:  Future Appointments   Date Time Provider Buzz Garcia   9/29/2023 10:00 AM LUIS Pyle CNP [facility]            Patient Active Problem List:

## 2023-06-20 ENCOUNTER — TELEPHONE (OUTPATIENT)
Dept: PRIMARY CARE CLINIC | Age: 53
End: 2023-06-20

## 2023-06-20 RX ORDER — CIPROFLOXACIN 500 MG/1
500 TABLET, FILM COATED ORAL 2 TIMES DAILY
Qty: 6 TABLET | Refills: 0 | Status: SHIPPED | OUTPATIENT
Start: 2023-06-20 | End: 2023-06-23

## 2023-06-20 NOTE — TELEPHONE ENCOUNTER
Patient called stating she was seen 5/25/23 for a UTI and completed her cipro 250 mg but feels as though the UTI is returning. She would like another refill. Please advise.

## 2023-08-01 DIAGNOSIS — E88.81 METABOLIC SYNDROME: ICD-10-CM

## 2023-08-01 RX ORDER — SEMAGLUTIDE 1.34 MG/ML
1 INJECTION, SOLUTION SUBCUTANEOUS WEEKLY
Qty: 9 ML | Refills: 1 | Status: SHIPPED | OUTPATIENT
Start: 2023-08-01

## 2023-08-01 RX ORDER — HYDROXYZINE HYDROCHLORIDE 25 MG/1
TABLET, FILM COATED ORAL
COMMUNITY
Start: 2023-07-24 | End: 2023-09-29

## 2023-08-01 NOTE — TELEPHONE ENCOUNTER
Health Maintenance   Topic Date Due    Breast cancer screen  10/09/2020    Colorectal Cancer Screen  10/21/2020    Flu vaccine (1) Never done    DTaP/Tdap/Td vaccine (1 - Tdap) 09/29/2023 (Originally 6/23/1988)    COVID-19 Vaccine (1) 09/29/2023 (Originally 1970)    Cervical cancer screen  09/29/2023 (Originally 6/23/1990)    Hepatitis C screen  03/29/2024 (Originally 6/23/1987)    Shingles vaccine (1 of 2) 05/24/2024 (Originally 6/23/2019)    Pneumococcal 0-64 years Vaccine (1 - PCV) 05/24/2024 (Originally 6/23/1975)    HIV screen  05/24/2024 (Originally 6/23/1984)    Lipids  10/01/2023    Depression Monitoring  03/29/2024    Hepatitis A vaccine  Aged Out    Hib vaccine  Aged Out    Meningococcal (ACWY) vaccine  Aged Out    Diabetes screen  Discontinued    Low dose CT lung screening &/or counseling  Discontinued             (applicable per patient's age: Cancer Screenings, Depression Screening, Fall Risk Screening, Immunizations)    LDL Cholesterol (mg/dL)   Date Value   10/01/2022 181 (H)     AST (U/L)   Date Value   10/01/2022 16     ALT (U/L)   Date Value   10/01/2022 21     BUN (mg/dL)   Date Value   10/01/2022 11      (goal A1C is < 7)   (goal LDL is <100) need 30-50% reduction from baseline     BP Readings from Last 3 Encounters:   05/24/23 122/70   03/29/23 124/78   09/29/22 120/78    (goal /80)      All Future Testing planned in [facility]:  Lab Frequency Next Occurrence   T4, Free Once 09/25/2023   TSH Once 09/25/2023   CBC with Auto Differential Once 09/25/2023   ALT Once 09/29/2023   AST Once 51/50/1992   Basic Metabolic Panel Once 97/26/6322   Hemoglobin A1C Once 09/25/2023   Lipid Panel Once 09/25/2023   Microalbumin, Ur Once 09/25/2023       Next Visit Date:  Future Appointments   Date Time Provider [address]   9/29/2023 10:00 AM Catarina Gonzalez APRN - CNP [facility]            Patient Active Problem List:     Dysthymia     Dyslipidemia     Congenital hypothyroidism without

## 2023-09-28 ENCOUNTER — TELEPHONE (OUTPATIENT)
Dept: PRIMARY CARE CLINIC | Age: 53
End: 2023-09-28

## 2023-09-29 ENCOUNTER — HOSPITAL ENCOUNTER (OUTPATIENT)
Age: 53
Discharge: HOME | End: 2023-09-29
Payer: COMMERCIAL

## 2023-09-29 ENCOUNTER — OFFICE VISIT (OUTPATIENT)
Dept: PRIMARY CARE CLINIC | Age: 53
End: 2023-09-29
Payer: COMMERCIAL

## 2023-09-29 VITALS
TEMPERATURE: 98.5 F | HEART RATE: 82 BPM | SYSTOLIC BLOOD PRESSURE: 120 MMHG | WEIGHT: 165.6 LBS | DIASTOLIC BLOOD PRESSURE: 74 MMHG | BODY MASS INDEX: 25.18 KG/M2 | RESPIRATION RATE: 20 BRPM | OXYGEN SATURATION: 98 %

## 2023-09-29 DIAGNOSIS — E88.810 METABOLIC SYNDROME: ICD-10-CM

## 2023-09-29 DIAGNOSIS — E03.1 CONGENITAL HYPOTHYROIDISM WITHOUT GOITER: ICD-10-CM

## 2023-09-29 DIAGNOSIS — E03.1 CONGENITAL HYPOTHYROIDISM WITHOUT GOITER: Primary | ICD-10-CM

## 2023-09-29 DIAGNOSIS — Z12.31 OTHER SCREENING MAMMOGRAM: ICD-10-CM

## 2023-09-29 DIAGNOSIS — M54.31 RIGHT SIDED SCIATICA: ICD-10-CM

## 2023-09-29 LAB
ALT SERPL-CCNC: 12 U/L (ref 5–33)
ANION GAP SERPL CALCULATED.3IONS-SCNC: 10 MMOL/L (ref 9–17)
AST SERPL-CCNC: 12 U/L
BASOPHILS # BLD: 0.05 K/UL (ref 0–0.2)
BASOPHILS NFR BLD: 1 % (ref 0–2)
BUN SERPL-MCNC: 18 MG/DL (ref 6–20)
BUN/CREAT SERPL: 23 (ref 9–20)
CALCIUM SERPL-MCNC: 9.4 MG/DL (ref 8.6–10.4)
CHLORIDE SERPL-SCNC: 110 MMOL/L (ref 98–107)
CHOLEST SERPL-MCNC: 236 MG/DL
CHOLESTEROL/HDL RATIO: 4.5
CO2 SERPL-SCNC: 23 MMOL/L (ref 20–31)
CREAT SERPL-MCNC: 0.8 MG/DL (ref 0.5–0.9)
CREAT UR-MCNC: 133 MG/DL (ref 28–217)
EOSINOPHIL # BLD: 0.12 K/UL (ref 0–0.44)
EOSINOPHILS RELATIVE PERCENT: 2 % (ref 1–4)
ERYTHROCYTE [DISTWIDTH] IN BLOOD BY AUTOMATED COUNT: 13.5 % (ref 11.8–14.4)
EST. AVERAGE GLUCOSE BLD GHB EST-MCNC: 108 MG/DL
GFR SERPL CREATININE-BSD FRML MDRD: >60 ML/MIN/1.73M2
GLUCOSE SERPL-MCNC: 90 MG/DL (ref 70–99)
HBA1C MFR BLD: 5.4 % (ref 4–6)
HCT VFR BLD AUTO: 44.8 % (ref 36.3–47.1)
HDLC SERPL-MCNC: 52 MG/DL
HGB BLD-MCNC: 14.5 G/DL (ref 11.9–15.1)
IMM GRANULOCYTES # BLD AUTO: <0.03 K/UL (ref 0–0.3)
IMM GRANULOCYTES NFR BLD: 0 %
LDLC SERPL CALC-MCNC: 167 MG/DL (ref 0–130)
LYMPHOCYTES NFR BLD: 2.61 K/UL (ref 1.1–3.7)
LYMPHOCYTES RELATIVE PERCENT: 38 % (ref 24–43)
MCH RBC QN AUTO: 31 PG (ref 25.2–33.5)
MCHC RBC AUTO-ENTMCNC: 32.4 G/DL (ref 28.4–34.8)
MCV RBC AUTO: 95.9 FL (ref 82.6–102.9)
MICROALBUMIN UR-MCNC: <12 MG/L
MICROALBUMIN/CREAT UR-RTO: NORMAL MCG/MG CREAT
MONOCYTES NFR BLD: 0.52 K/UL (ref 0.1–1.2)
MONOCYTES NFR BLD: 8 % (ref 3–12)
NEUTROPHILS NFR BLD: 51 % (ref 36–65)
NEUTS SEG NFR BLD: 3.56 K/UL (ref 1.5–8.1)
NRBC BLD-RTO: 0 PER 100 WBC
PLATELET # BLD AUTO: 302 K/UL (ref 138–453)
PMV BLD AUTO: 10.7 FL (ref 8.1–13.5)
POTASSIUM SERPL-SCNC: 4.4 MMOL/L (ref 3.7–5.3)
RBC # BLD AUTO: 4.67 M/UL (ref 3.95–5.11)
SODIUM SERPL-SCNC: 143 MMOL/L (ref 135–144)
T4 FREE SERPL-MCNC: 1.2 NG/DL (ref 0.9–1.7)
TRIGL SERPL-MCNC: 87 MG/DL
TSH SERPL DL<=0.05 MIU/L-ACNC: 1.15 UIU/ML (ref 0.3–5)
WBC OTHER # BLD: 6.9 K/UL (ref 3.5–11.3)

## 2023-09-29 PROCEDURE — 80048 BASIC METABOLIC PNL TOTAL CA: CPT

## 2023-09-29 PROCEDURE — 85025 COMPLETE CBC W/AUTO DIFF WBC: CPT

## 2023-09-29 PROCEDURE — 84450 TRANSFERASE (AST) (SGOT): CPT

## 2023-09-29 PROCEDURE — 82043 UR ALBUMIN QUANTITATIVE: CPT

## 2023-09-29 PROCEDURE — 84443 ASSAY THYROID STIM HORMONE: CPT

## 2023-09-29 PROCEDURE — 82570 ASSAY OF URINE CREATININE: CPT

## 2023-09-29 PROCEDURE — 84439 ASSAY OF FREE THYROXINE: CPT

## 2023-09-29 PROCEDURE — 84460 ALANINE AMINO (ALT) (SGPT): CPT

## 2023-09-29 PROCEDURE — 80061 LIPID PANEL: CPT

## 2023-09-29 PROCEDURE — 36415 COLL VENOUS BLD VENIPUNCTURE: CPT

## 2023-09-29 PROCEDURE — 99214 OFFICE O/P EST MOD 30 MIN: CPT | Performed by: NURSE PRACTITIONER

## 2023-09-29 PROCEDURE — 83036 HEMOGLOBIN GLYCOSYLATED A1C: CPT

## 2023-09-29 RX ORDER — METHYLPREDNISOLONE 4 MG/1
TABLET ORAL
Qty: 1 KIT | Refills: 0 | Status: SHIPPED | OUTPATIENT
Start: 2023-09-29 | End: 2023-10-05

## 2023-09-29 RX ORDER — LEVOTHYROXINE SODIUM 0.05 MG/1
50 TABLET ORAL DAILY
Qty: 90 TABLET | Refills: 3 | Status: SHIPPED | OUTPATIENT
Start: 2023-09-29

## 2023-09-29 NOTE — PROGRESS NOTES
tenderness. Musculoskeletal:      Cervical back: Normal range of motion and neck supple. Lumbar back: Normal range of motion. Positive right straight leg raise test. Negative left straight leg raise test.        Back:       Right lower leg: No edema. Left lower leg: No edema. Lymphadenopathy:      Cervical: No cervical adenopathy. Skin:     General: Skin is warm and dry. Findings: No rash. Neurological:      Mental Status: She is alert and oriented to person, place, and time. Psychiatric:         Mood and Affect: Mood normal.         Behavior: Behavior normal.         Data:     Lab Results   Component Value Date/Time     09/29/2023 07:13 AM    K 4.4 09/29/2023 07:13 AM     09/29/2023 07:13 AM    CO2 23 09/29/2023 07:13 AM    BUN 18 09/29/2023 07:13 AM    CREATININE 0.8 09/29/2023 07:13 AM    GLUCOSE 90 09/29/2023 07:13 AM    PROT 7.3 11/19/2016 07:13 AM    LABALBU 4.3 11/19/2016 07:13 AM    BILITOT 0.33 11/19/2016 07:13 AM    ALKPHOS 65 11/19/2016 07:13 AM    AST 12 09/29/2023 07:13 AM    ALT 12 09/29/2023 07:13 AM     Lab Results   Component Value Date/Time    WBC 6.9 09/29/2023 07:13 AM    RBC 4.67 09/29/2023 07:13 AM    HGB 14.5 09/29/2023 07:13 AM    HCT 44.8 09/29/2023 07:13 AM    MCV 95.9 09/29/2023 07:13 AM    MCH 31.0 09/29/2023 07:13 AM    MCHC 32.4 09/29/2023 07:13 AM    RDW 13.5 09/29/2023 07:13 AM     09/29/2023 07:13 AM    MPV 10.7 09/29/2023 07:13 AM     Lab Results   Component Value Date/Time    TSH 1.15 09/29/2023 07:13 AM     Lab Results   Component Value Date/Time    CHOL 266 10/01/2022 08:21 AM    HDL 60 10/01/2022 08:21 AM       Assessment/Plan:      Diagnosis Orders   1. Congenital hypothyroidism without goiter  levothyroxine (SYNTHROID) 50 MCG tablet      2. Right sided sciatica  methylPREDNISolone (MEDROL DOSEPACK) 4 MG tablet      3. Metabolic syndrome        4.  Other screening mammogram  MOISES MEGHAN DIGITAL SCREEN BILATERAL        We will continue all

## 2023-09-30 ASSESSMENT — ENCOUNTER SYMPTOMS
VISUAL CHANGE: 0
RHINORRHEA: 0
HOARSE VOICE: 0
VOMITING: 0
NAUSEA: 0
WHEEZING: 0
ABDOMINAL PAIN: 0
COUGH: 0
SHORTNESS OF BREATH: 0
HAIR LOSS: 0
CONSTIPATION: 0
DIARRHEA: 0
BACK PAIN: 1
BOWEL INCONTINENCE: 0
SORE THROAT: 0

## 2023-10-02 ENCOUNTER — TELEPHONE (OUTPATIENT)
Dept: PRIMARY CARE CLINIC | Age: 53
End: 2023-10-02

## 2023-10-03 ENCOUNTER — HOSPITAL ENCOUNTER (OUTPATIENT)
Dept: WOMENS IMAGING | Age: 53
Discharge: HOME | End: 2023-10-05
Payer: COMMERCIAL

## 2023-10-03 VITALS — BODY MASS INDEX: 25.01 KG/M2 | WEIGHT: 165 LBS | HEIGHT: 68 IN

## 2023-10-03 DIAGNOSIS — Z12.31 OTHER SCREENING MAMMOGRAM: ICD-10-CM

## 2023-10-03 PROCEDURE — 77063 BREAST TOMOSYNTHESIS BI: CPT

## 2023-10-04 ENCOUNTER — TELEPHONE (OUTPATIENT)
Dept: PRIMARY CARE CLINIC | Age: 53
End: 2023-10-04

## 2023-10-04 NOTE — TELEPHONE ENCOUNTER
----- Message from [address] [facility] Faviola, LUIS - CNP sent at 10/4/2023 10:16 AM EDT -----  Results are normal, please call patient and make them aware.

## 2023-12-26 DIAGNOSIS — F34.1 DYSTHYMIA: ICD-10-CM

## 2023-12-26 RX ORDER — DESVENLAFAXINE 100 MG/1
TABLET, EXTENDED RELEASE ORAL
Qty: 90 TABLET | Refills: 1 | Status: SHIPPED | OUTPATIENT
Start: 2023-12-26

## 2023-12-26 NOTE — TELEPHONE ENCOUNTER
Pt states previous psychiatrist was prescribing but she is no longer seeing that provider. She states B. Might agreed to prescribe.

## 2024-01-15 DIAGNOSIS — E88.810 METABOLIC SYNDROME: ICD-10-CM

## 2024-01-15 RX ORDER — SEMAGLUTIDE 1.34 MG/ML
1 INJECTION, SOLUTION SUBCUTANEOUS WEEKLY
Qty: 9 ML | Refills: 1 | Status: SHIPPED | OUTPATIENT
Start: 2024-01-15

## 2024-01-15 NOTE — TELEPHONE ENCOUNTER
Health Maintenance   Topic Date Due    COVID-19 Vaccine (1) Never done    DTaP/Tdap/Td vaccine (1 - Tdap) Never done    Cervical cancer screen  Never done    Colorectal Cancer Screen  10/21/2020    Hepatitis C screen  03/29/2024 (Originally 6/23/1987)    Shingles vaccine (1 of 2) 05/24/2024 (Originally 6/23/2019)    Pneumococcal 0-64 years Vaccine (1 - PCV) 05/24/2024 (Originally 6/23/1975)    HIV screen  05/24/2024 (Originally 6/23/1984)    Hepatitis B vaccine (1 of 3 - 3-dose series) 09/29/2024 (Originally 1970)    Flu vaccine (1) 09/29/2024 (Originally 8/1/2023)    Depression Monitoring  03/29/2024    Breast cancer screen  10/03/2025    Lipids  09/29/2028    Hepatitis A vaccine  Aged Out    Hib vaccine  Aged Out    Polio vaccine  Aged Out    Meningococcal (ACWY) vaccine  Aged Out    Diabetes screen  Discontinued    Low dose CT lung screening &/or counseling  Discontinued             (applicable per patient's age: Cancer Screenings, Depression Screening, Fall Risk Screening, Immunizations)    Hemoglobin A1C (%)   Date Value   09/29/2023 5.4     LDL Cholesterol (mg/dL)   Date Value   09/29/2023 167 (H)     AST (U/L)   Date Value   09/29/2023 12     ALT (U/L)   Date Value   09/29/2023 12     BUN (mg/dL)   Date Value   09/29/2023 18      (goal A1C is < 7)   (goal LDL is <100) need 30-50% reduction from baseline     BP Readings from Last 3 Encounters:   09/29/23 120/74   05/24/23 122/70   03/29/23 124/78    (goal /80)      All Future Testing planned in [facility]:  Lab Frequency Next Occurrence       Next Visit Date:  Future Appointments   Date Time Provider Department Center   3/29/2024  9:00 AM Alex Gonzalez APRN - CNP [facility]            Patient Active Problem List:     Dysthymia     Dyslipidemia     Congenital hypothyroidism without goiter     Constipation     Metabolic syndrome     Chronic allergic rhinitis

## 2024-01-29 ENCOUNTER — TELEPHONE (OUTPATIENT)
Dept: PRIMARY CARE CLINIC | Age: 54
End: 2024-01-29

## 2024-01-29 NOTE — TELEPHONE ENCOUNTER
She is at the maximum dose of desvenlafaxine.  Multiple studies have shown that dosing any higher has not shown to be of any benefit and can be harmful.  Is she having specific issues?

## 2024-01-29 NOTE — TELEPHONE ENCOUNTER
Patient called would like Pristiq increased if possible, patient has appointment 3/29/2024.Please advise.

## 2024-03-29 ENCOUNTER — OFFICE VISIT (OUTPATIENT)
Dept: PRIMARY CARE CLINIC | Age: 54
End: 2024-03-29

## 2024-03-29 VITALS
HEIGHT: 68 IN | HEART RATE: 61 BPM | DIASTOLIC BLOOD PRESSURE: 62 MMHG | OXYGEN SATURATION: 100 % | TEMPERATURE: 98.6 F | SYSTOLIC BLOOD PRESSURE: 118 MMHG | BODY MASS INDEX: 22.35 KG/M2 | WEIGHT: 147.5 LBS | RESPIRATION RATE: 18 BRPM

## 2024-03-29 DIAGNOSIS — L57.0 ACTINIC KERATOSES: ICD-10-CM

## 2024-03-29 DIAGNOSIS — E88.810 METABOLIC SYNDROME: ICD-10-CM

## 2024-03-29 DIAGNOSIS — E03.1 CONGENITAL HYPOTHYROIDISM WITHOUT GOITER: Primary | ICD-10-CM

## 2024-03-29 SDOH — ECONOMIC STABILITY: FOOD INSECURITY: WITHIN THE PAST 12 MONTHS, YOU WORRIED THAT YOUR FOOD WOULD RUN OUT BEFORE YOU GOT MONEY TO BUY MORE.: NEVER TRUE

## 2024-03-29 SDOH — ECONOMIC STABILITY: FOOD INSECURITY: WITHIN THE PAST 12 MONTHS, THE FOOD YOU BOUGHT JUST DIDN'T LAST AND YOU DIDN'T HAVE MONEY TO GET MORE.: NEVER TRUE

## 2024-03-29 SDOH — ECONOMIC STABILITY: INCOME INSECURITY: HOW HARD IS IT FOR YOU TO PAY FOR THE VERY BASICS LIKE FOOD, HOUSING, MEDICAL CARE, AND HEATING?: NOT HARD AT ALL

## 2024-03-29 SDOH — ECONOMIC STABILITY: HOUSING INSECURITY
IN THE LAST 12 MONTHS, WAS THERE A TIME WHEN YOU DID NOT HAVE A STEADY PLACE TO SLEEP OR SLEPT IN A SHELTER (INCLUDING NOW)?: NO

## 2024-03-29 ASSESSMENT — PATIENT HEALTH QUESTIONNAIRE - PHQ9
10. IF YOU CHECKED OFF ANY PROBLEMS, HOW DIFFICULT HAVE THESE PROBLEMS MADE IT FOR YOU TO DO YOUR WORK, TAKE CARE OF THINGS AT HOME, OR GET ALONG WITH OTHER PEOPLE: NOT DIFFICULT AT ALL
9. THOUGHTS THAT YOU WOULD BE BETTER OFF DEAD, OR OF HURTING YOURSELF: NOT AT ALL
SUM OF ALL RESPONSES TO PHQ QUESTIONS 1-9: 8
5. POOR APPETITE OR OVEREATING: NOT AT ALL
SUM OF ALL RESPONSES TO PHQ9 QUESTIONS 1 & 2: 1
SUM OF ALL RESPONSES TO PHQ QUESTIONS 1-9: 8
3. TROUBLE FALLING OR STAYING ASLEEP: NEARLY EVERY DAY
6. FEELING BAD ABOUT YOURSELF - OR THAT YOU ARE A FAILURE OR HAVE LET YOURSELF OR YOUR FAMILY DOWN: NOT AT ALL
2. FEELING DOWN, DEPRESSED OR HOPELESS: NOT AT ALL
SUM OF ALL RESPONSES TO PHQ QUESTIONS 1-9: 8
8. MOVING OR SPEAKING SO SLOWLY THAT OTHER PEOPLE COULD HAVE NOTICED. OR THE OPPOSITE, BEING SO FIGETY OR RESTLESS THAT YOU HAVE BEEN MOVING AROUND A LOT MORE THAN USUAL: NOT AT ALL
1. LITTLE INTEREST OR PLEASURE IN DOING THINGS: SEVERAL DAYS
SUM OF ALL RESPONSES TO PHQ QUESTIONS 1-9: 8
4. FEELING TIRED OR HAVING LITTLE ENERGY: SEVERAL DAYS
7. TROUBLE CONCENTRATING ON THINGS, SUCH AS READING THE NEWSPAPER OR WATCHING TELEVISION: NEARLY EVERY DAY

## 2024-03-29 NOTE — PROGRESS NOTES
Patient refused colon cancer screening today.   
instructions  3.  Was a self-tracking handout given in paper form or via [employer]? No  If yes, see orders or list here.  4.  Discussed use, benefit, and side effects of prescribed medications.  Barriers to medication compliance addressed.  All patient questions answered.Pt voiced understanding.   5.  Reviewed prior labs and health maintenance  6.  Continue current medications, diet and exercise.    Completed Refills   Requested Prescriptions      No prescriptions requested or ordered in this encounter         Return in about 6 months (around 9/29/2024) for Check up.

## 2024-03-29 NOTE — PATIENT INSTRUCTIONS
SURVEY:     You may be receiving a survey from [facility] [employer] regarding your visit today.     Please complete the survey to enable us to provide the highest quality of care to you and your family.     If you cannot score us a very good on any question, please call the office to discuss how we could have made your experience a very good one.     Thank you,    Alex Gonzalez, APRN-CNP  Peg Saul, APRN-CNP  Michaela, LPN  Sarahy, CMA  French, CMA  Ofelia, CMA  Charlotte, PCA  Norma, CMA  Tonia, PM

## 2024-06-29 DIAGNOSIS — E88.810 METABOLIC SYNDROME: ICD-10-CM

## 2024-06-29 RX ORDER — SEMAGLUTIDE 1.34 MG/ML
INJECTION, SOLUTION SUBCUTANEOUS
Qty: 9 ML | Refills: 0 | Status: SHIPPED | OUTPATIENT
Start: 2024-06-29

## 2024-07-29 ENCOUNTER — TELEPHONE (OUTPATIENT)
Dept: PRIMARY CARE CLINIC | Age: 54
End: 2024-07-29

## 2024-07-29 DIAGNOSIS — E88.810 METABOLIC SYNDROME: ICD-10-CM

## 2024-07-29 RX ORDER — SEMAGLUTIDE 1.34 MG/ML
INJECTION, SOLUTION SUBCUTANEOUS
Qty: 9 ML | Refills: 0 | Status: CANCELLED | OUTPATIENT
Start: 2024-07-29

## 2024-07-29 RX ORDER — SEMAGLUTIDE 2.68 MG/ML
2 INJECTION, SOLUTION SUBCUTANEOUS
Qty: 9 ML | Refills: 3 | Status: SHIPPED | OUTPATIENT
Start: 2024-07-29

## 2024-07-29 NOTE — TELEPHONE ENCOUNTER
Patient would like dose adjustment if possible. Current weight 155.0 lb. Feels like not working.        Health Maintenance   Topic Date Due    Pneumococcal 0-64 years Vaccine (1 of 2 - PCV) Never done    HIV screen  Never done    Hepatitis C screen  Never done    Cervical cancer screen  Never done    Shingles vaccine (1 of 2) Never done    Colorectal Cancer Screen  10/21/2020    Hepatitis B vaccine (1 of 3 - 3-dose series) 09/29/2024 (Originally 1970)    DTaP/Tdap/Td vaccine (1 - Tdap) 03/29/2025 (Originally 6/23/1988)    COVID-19 Vaccine (1) 03/29/2025 (Originally 1970)    Flu vaccine (1) 08/01/2024    Depression Monitoring  03/29/2025    Breast cancer screen  10/03/2025    Lipids  09/29/2028    Hepatitis A vaccine  Aged Out    Hib vaccine  Aged Out    Polio vaccine  Aged Out    Meningococcal (ACWY) vaccine  Aged Out    Diabetes screen  Discontinued    Lung Cancer Screening &/or Counseling  Discontinued             (applicable per patient's age: Cancer Screenings, Depression Screening, Fall Risk Screening, Immunizations)    Hemoglobin A1C (%)   Date Value   09/29/2023 5.4     AST (U/L)   Date Value   09/29/2023 12     ALT (U/L)   Date Value   09/29/2023 12     BUN (mg/dL)   Date Value   09/29/2023 18      (goal A1C is < 7)   (goal LDL is <100) need 30-50% reduction from baseline     BP Readings from Last 3 Encounters:   03/29/24 118/62   09/29/23 120/74   05/24/23 122/70    (goal /80)      All Future Testing planned in [facility]:  Lab Frequency Next Occurrence   CBC with Auto Differential Once 09/25/2024   ALT Once 09/29/2024   AST Once 09/29/2024   Basic Metabolic Panel Once 09/29/2024   Hemoglobin A1C Once 09/25/2024   Lipid Panel Once 09/25/2024   Microalbumin, Ur Once 09/25/2024   T4, Free Once 09/25/2024   TSH Once 09/25/2024       Next Visit Date:  Future Appointments   Date Time Provider Department Center   10/1/2024  9:40 AM Alex Gonzalez, APRN - CNP [facility]            Patient

## 2024-09-24 ENCOUNTER — TELEPHONE (OUTPATIENT)
Dept: PRIMARY CARE CLINIC | Age: 54
End: 2024-09-24

## 2024-09-24 ENCOUNTER — HOSPITAL ENCOUNTER (OUTPATIENT)
Age: 54
Discharge: HOME | End: 2024-09-24
Payer: COMMERCIAL

## 2024-09-24 DIAGNOSIS — E03.1 CONGENITAL HYPOTHYROIDISM WITHOUT GOITER: ICD-10-CM

## 2024-09-24 DIAGNOSIS — E88.810 METABOLIC SYNDROME: ICD-10-CM

## 2024-09-24 LAB
ALT SERPL-CCNC: 12 U/L (ref 10–35)
ANION GAP SERPL CALCULATED.3IONS-SCNC: 10 MMOL/L (ref 9–16)
AST SERPL-CCNC: 14 U/L (ref 10–35)
BASOPHILS # BLD: 0.05 K/UL (ref 0–0.2)
BASOPHILS NFR BLD: 1 % (ref 0–2)
BUN SERPL-MCNC: 15 MG/DL (ref 6–20)
BUN/CREAT SERPL: 21 (ref 9–20)
CALCIUM SERPL-MCNC: 9.6 MG/DL (ref 8.6–10.4)
CHLORIDE SERPL-SCNC: 105 MMOL/L (ref 98–107)
CO2 SERPL-SCNC: 26 MMOL/L (ref 20–31)
CREAT SERPL-MCNC: 0.7 MG/DL (ref 0.5–0.9)
EOSINOPHIL # BLD: 0.07 K/UL (ref 0–0.44)
EOSINOPHILS RELATIVE PERCENT: 1 % (ref 1–4)
ERYTHROCYTE [DISTWIDTH] IN BLOOD BY AUTOMATED COUNT: 13.2 % (ref 11.8–14.4)
GFR, ESTIMATED: >90 ML/MIN/1.73M2
GLUCOSE SERPL-MCNC: 78 MG/DL (ref 74–99)
HCT VFR BLD AUTO: 40.6 % (ref 36.3–47.1)
HGB BLD-MCNC: 13.7 G/DL (ref 11.9–15.1)
IMM GRANULOCYTES # BLD AUTO: <0.03 K/UL (ref 0–0.3)
IMM GRANULOCYTES NFR BLD: 0 %
LYMPHOCYTES NFR BLD: 3.4 K/UL (ref 1.1–3.7)
LYMPHOCYTES RELATIVE PERCENT: 43 % (ref 24–43)
MCH RBC QN AUTO: 31.8 PG (ref 25.2–33.5)
MCHC RBC AUTO-ENTMCNC: 33.7 G/DL (ref 28.4–34.8)
MCV RBC AUTO: 94.2 FL (ref 82.6–102.9)
MONOCYTES NFR BLD: 0.59 K/UL (ref 0.1–1.2)
MONOCYTES NFR BLD: 8 % (ref 3–12)
NEUTROPHILS NFR BLD: 47 % (ref 36–65)
NEUTS SEG NFR BLD: 3.75 K/UL (ref 1.5–8.1)
NRBC BLD-RTO: 0 PER 100 WBC
PLATELET # BLD AUTO: 291 K/UL (ref 138–453)
PMV BLD AUTO: 10.4 FL (ref 8.1–13.5)
POTASSIUM SERPL-SCNC: 3.8 MMOL/L (ref 3.7–5.3)
RBC # BLD AUTO: 4.31 M/UL (ref 3.95–5.11)
SODIUM SERPL-SCNC: 141 MMOL/L (ref 136–145)
TSH SERPL DL<=0.05 MIU/L-ACNC: 1.33 UIU/ML (ref 0.27–4.2)
WBC OTHER # BLD: 7.9 K/UL (ref 3.5–11.3)

## 2024-09-24 PROCEDURE — 84443 ASSAY THYROID STIM HORMONE: CPT

## 2024-09-24 PROCEDURE — 36415 COLL VENOUS BLD VENIPUNCTURE: CPT

## 2024-09-24 PROCEDURE — 80061 LIPID PANEL: CPT

## 2024-09-24 PROCEDURE — 83036 HEMOGLOBIN GLYCOSYLATED A1C: CPT

## 2024-09-24 PROCEDURE — 82570 ASSAY OF URINE CREATININE: CPT

## 2024-09-24 PROCEDURE — 82043 UR ALBUMIN QUANTITATIVE: CPT

## 2024-09-24 PROCEDURE — 84460 ALANINE AMINO (ALT) (SGPT): CPT

## 2024-09-24 PROCEDURE — 85025 COMPLETE CBC W/AUTO DIFF WBC: CPT

## 2024-09-24 PROCEDURE — 84439 ASSAY OF FREE THYROXINE: CPT

## 2024-09-24 PROCEDURE — 84450 TRANSFERASE (AST) (SGOT): CPT

## 2024-09-24 PROCEDURE — 80048 BASIC METABOLIC PNL TOTAL CA: CPT

## 2024-09-25 LAB
CHOLEST SERPL-MCNC: 221 MG/DL (ref 0–199)
CHOLESTEROL/HDL RATIO: 4
CREAT UR-MCNC: 174 MG/DL (ref 28–217)
EST. AVERAGE GLUCOSE BLD GHB EST-MCNC: 111 MG/DL
HBA1C MFR BLD: 5.5 % (ref 4–6)
HDLC SERPL-MCNC: 63 MG/DL
LDLC SERPL CALC-MCNC: 144 MG/DL (ref 0–100)
MICROALBUMIN UR-MCNC: <12 MG/L (ref 0–20)
MICROALBUMIN/CREAT UR-RTO: NORMAL MCG/MG CREAT (ref 0–25)
T4 FREE SERPL-MCNC: 1.4 NG/DL (ref 0.92–1.68)
TRIGL SERPL-MCNC: 73 MG/DL
VLDLC SERPL CALC-MCNC: 15 MG/DL

## 2024-10-01 ENCOUNTER — OFFICE VISIT (OUTPATIENT)
Dept: PRIMARY CARE CLINIC | Age: 54
End: 2024-10-01
Payer: COMMERCIAL

## 2024-10-01 VITALS
DIASTOLIC BLOOD PRESSURE: 68 MMHG | OXYGEN SATURATION: 96 % | HEART RATE: 77 BPM | TEMPERATURE: 98.2 F | SYSTOLIC BLOOD PRESSURE: 112 MMHG

## 2024-10-01 DIAGNOSIS — E03.1 CONGENITAL HYPOTHYROIDISM WITHOUT GOITER: Primary | ICD-10-CM

## 2024-10-01 DIAGNOSIS — M54.31 BILATERAL SCIATICA: ICD-10-CM

## 2024-10-01 DIAGNOSIS — M54.32 BILATERAL SCIATICA: ICD-10-CM

## 2024-10-01 DIAGNOSIS — Z12.31 VISIT FOR SCREENING MAMMOGRAM: ICD-10-CM

## 2024-10-01 PROCEDURE — 99214 OFFICE O/P EST MOD 30 MIN: CPT | Performed by: NURSE PRACTITIONER

## 2024-10-01 RX ORDER — METHYLPREDNISOLONE 4 MG
TABLET, DOSE PACK ORAL
Qty: 1 KIT | Refills: 0 | Status: SHIPPED | OUTPATIENT
Start: 2024-10-01 | End: 2024-10-07

## 2024-10-01 RX ORDER — LEVOTHYROXINE SODIUM 25 UG/1
TABLET ORAL
Qty: 90 TABLET | Refills: 1 | Status: SHIPPED | OUTPATIENT
Start: 2024-10-01

## 2024-10-01 RX ORDER — LEVOTHYROXINE SODIUM 50 UG/1
50 TABLET ORAL DAILY
Qty: 90 TABLET | Refills: 3 | Status: SHIPPED | OUTPATIENT
Start: 2024-10-01

## 2024-10-01 ASSESSMENT — ENCOUNTER SYMPTOMS
NAUSEA: 0
DIARRHEA: 0
SHORTNESS OF BREATH: 0
ABDOMINAL PAIN: 0
SORE THROAT: 0
VOMITING: 0
COUGH: 0
VISUAL CHANGE: 0
CONSTIPATION: 0
RHINORRHEA: 0
HOARSE VOICE: 0
BOWEL INCONTINENCE: 0
HAIR LOSS: 0
BACK PAIN: 1
WHEEZING: 0

## 2024-10-01 NOTE — PROGRESS NOTES
goiter  levothyroxine (SYNTHROID) 50 MCG tablet    levothyroxine (SYNTHROID) 25 MCG tablet    T4, Free    TSH      2. Bilateral sciatica  [facility] [place]    methylPREDNISolone (MEDROL DOSEPACK) 4 MG tablet      3. Visit for screening mammogram  MOISES MEGHAN DIGITAL SCREEN BILATERAL        We will have her continue current medication and increase dose of levothyroxine to 75 mg on Sundays and Wednesdays.  We will repeat labs in 3 months.    Referral to PT for evaluation.  Medrol Dosepak as directed.  We will follow with progress.    We will see her back in 6 months for routine check, sooner if any issues.      1.  Delilah received counseling on the following healthy behaviors: nutrition, exercise, and medication adherence  2.  Patient given educational materials - see patient instructions  3.  Was a self-tracking handout given in paper form or via [employer]? No  If yes, see orders or list here.  4.  Discussed use, benefit, and side effects of prescribed medications.  Barriers to medication compliance addressed.  All patient questions answered.Pt voiced understanding.   5.  Reviewed prior labs and health maintenance  6.  Continue current medications, diet and exercise.    Completed Refills   Requested Prescriptions     Signed Prescriptions Disp Refills    levothyroxine (SYNTHROID) 50 MCG tablet 90 tablet 3     Sig: Take 1 tablet by mouth Daily    methylPREDNISolone (MEDROL DOSEPACK) 4 MG tablet 1 kit 0     Sig: Take by mouth.    levothyroxine (SYNTHROID) 25 MCG tablet 90 tablet 1     Sig: Take one tablet by mouth on Sunday and Wednesdays every week.         Return in about 6 months (around 4/1/2025) for Check up- A1c in office.

## 2024-10-04 ENCOUNTER — HOSPITAL ENCOUNTER (OUTPATIENT)
Dept: PHYSICAL THERAPY | Age: 54
Setting detail: THERAPIES SERIES
Discharge: HOME | End: 2024-10-04
Payer: COMMERCIAL

## 2024-10-04 PROCEDURE — 97161 PT EVAL LOW COMPLEX 20 MIN: CPT

## 2024-10-04 NOTE — PROGRESS NOTES
[facility]           Phone: [phone]             Outpatient Physical Therapy  Fax: [phone]                                           Date: 10/4/2024  Patient: Delilah Motta : 1970 [facility] #: [id number]   Referring Physician: Alex Gonzalez APRN - *      [x] Plan of Care   [] Updated Plan of Care    Dates of Service to Include: 10/4/2024 to 24    Diagnosis:  M54.31, M54.32 (ICD-10-CM) - Bilateral sciatica     Rehab (Treatment) Diagnosis:  hip weakness         Onset Date:  24    Attendance  Total # of Visits to Date: 1 No Show: 0 Canceled Appointment: 0    Assessment  Body Structures, Functions, Activity Limitations Requiring Skilled Therapeutic Intervention: Decreased functional mobility , Decreased ROM, Decreased strength, Increased pain  Assessment: Pt is a 54 y.o. female who presents to therapy with B lateral hip pain. Upon exam pt has TTP to greater trochanter on both hips. Pt demos B weakness in hip abd, add and ext primarily. Pt lacking IR on R side at 30* and is painful with IR B. (-) slump, SLR and distraction. Pt would benefit from skilled therapy in order to address these deficits and return to PLOF.      Goals  Short Term Goals  Time Frame for Short Term Goals: 2 weeks  Short Term Goal 1: Pt will be inititated with HEP.  Short Term Goal 2: Pt will tolerate 30-40 minutes of ther ex to improve function with ADL's.  Long Term Goals  Time Frame for Long Term Goals : 6 weeks  Long Term Goal 1: Pt will independant and compliant with HEP to maintain functional gains made at therapy.  Long Term Goal 2: Pt will report 2/10 or less B lateral hip pain on average to facilitate completion of ADL's.  Long Term Goal 3: Pt to improve B hip IR to be non painful and R IR from 30* to 55* or better for improved mobility.  Long Term Goal 4: Pt to improve B hip Add, Abd and Ext from 4- to 4+/5 w/o pain for 
firehydrants, quadruped donkey kick, maris pose, banded bridges    Functional Outcome Measures         Self report impairment 70%       Assessment  Body Structures, Functions, Activity Limitations Requiring Skilled Therapeutic Intervention: Decreased functional mobility , Decreased ROM, Decreased strength, Increased pain  Assessment: Pt is a 54 y.o. female who presents to therapy with B lateral hip pain. Upon exam pt has TTP to greater trochanter on both hips. Pt demos B weakness in hip abd, add and ext primarily. Pt lacking IR on R side at 30* and is painful with IR B. (-) slump, SLR and distraction. Pt would benefit from skilled therapy in order to address these deficits and return to PLOF.  Therapy Prognosis: Good        Decision Making: Low Complexity      Patient Education  Patient Education: Pt educatated on PT POC, HEP  Pt verbalized/demonstrated good understanding:     [X] Yes         [] No, pt required further clarification.      Goals  Short Term Goals  Time Frame for Short Term Goals: 2 weeks  Short Term Goal 1: Pt will be inititated with HEP.  Short Term Goal 2: Pt will tolerate 30-40 minutes of ther ex to improve function with ADL's.    Long Term Goals  Time Frame for Long Term Goals : 6 weeks  Long Term Goal 1: Pt will independant and compliant with HEP to maintain functional gains made at therapy.  Long Term Goal 2: Pt will report 2/10 or less B lateral hip pain on average to facilitate completion of ADL's.  Long Term Goal 3: Pt to improve B hip IR to be non painful and R IR from 30* to 55* or better for improved mobility.  Long Term Goal 4: Pt to improve B hip Add, Abd and Ext from 4- to 4+/5 w/o pain for ease with work related duties.  Long Term Goal 5: Pt to improve self report impairment from 70% to 15% or less for improved QOL.               Minutes Tracking:  Time In: 0710  Time Out: 0750  Minutes: 40  Timed Code Treatment Minutes: 38 Minutes    SOLEDAD ROSALES PT, DPT    10/4/2024  Electronically

## 2024-10-04 NOTE — THERAPY DISCHARGE
Phone: [phone]                 [facility]          Fax: [phone]                            Outpatient Physical Therapy                                                                    Discharge Summary    Patient: Delilah oMtta  : 1970  [facility] #: [id number]   Referring Physician: Alex Gonzalez APRN - *  Diagnosis: M54.31, M54.32 (ICD-10-CM) - Bilateral sciatica  Treatment Diagnosis: hip weakness      Date Treatment Initiated: 10/04/24  Date of Last Treatment: 10/04/24      PT Visit Information  Onset Date: 24  PT Insurance Information: [employer]  Total # of Visits Approved: 10  Total # of Visits to Date: 1  Plan of Care/Certification Expiration Date: 24  No Show: 0  Canceled Appointment: 0  Referring Provider (secondary): Alex Gonzalez APRN - CNP      Frequency/Duration  Plan Frequency: 2 times per week  Plan weeks: 4 weeks      Treatment Received  [x] HP/CP      [x] Electrical Stim   [x] Therapeutic Exercise      [x] Gait Training  [] Aquatics   [] Ultrasound         [x] Patient Education/HEP   [x] Manual Therapy  [] Traction    [] Neuro-leonard        [x] Soft Tissue Mobs            [x] Therapeutic Act  [] Iontophoresis    [] Orthotic casting/fitting      [] Dry Needling    Assessment  Assessment: Pt attended evaluation only. Met goals for HEP. Called later in the day to cancel all appointments. Pt to be d/c at this time.       Goals  Short Term Goals  Time Frame for Short Term Goals: 2 weeks  Short Term Goal 1: Pt will be inititated with HEP.  Short Term Goal 2: Pt will tolerate 30-40 minutes of ther ex to improve function with ADL's.    Long Term Goals  Time Frame for Long Term Goals : 6 weeks  Long Term Goal 1: Pt will independant and compliant with HEP to maintain functional gains made at therapy.  Long Term Goal 2: Pt will report 2/10 or less B lateral hip pain on average to facilitate completion of ADL's.  Long Term Goal 3: Pt to improve B hip IR to be non painful and R IR

## 2024-10-10 ENCOUNTER — APPOINTMENT (OUTPATIENT)
Dept: PHYSICAL THERAPY | Age: 54
End: 2024-10-10
Payer: COMMERCIAL

## 2024-10-11 ENCOUNTER — APPOINTMENT (OUTPATIENT)
Dept: PHYSICAL THERAPY | Age: 54
End: 2024-10-11
Payer: COMMERCIAL

## 2024-10-14 ENCOUNTER — TELEPHONE (OUTPATIENT)
Dept: PRIMARY CARE CLINIC | Age: 54
End: 2024-10-14

## 2024-10-14 NOTE — TELEPHONE ENCOUNTER
Patient contacted office stating that she is having extreme fatigue, and HA. Patient states that she had slept all weekend, patient is questioning if it could be the extra 25mg of levothyroxine that she is taking on Sunday and Wednesdays.         Please advise.

## 2024-10-14 NOTE — TELEPHONE ENCOUNTER
That would be very unlikely. That medication would help fatigue. Would recommend being seen if this does not improve.

## 2024-10-15 ENCOUNTER — APPOINTMENT (OUTPATIENT)
Dept: PHYSICAL THERAPY | Age: 54
End: 2024-10-15
Payer: COMMERCIAL

## 2024-10-18 ENCOUNTER — APPOINTMENT (OUTPATIENT)
Dept: PHYSICAL THERAPY | Age: 54
End: 2024-10-18
Payer: COMMERCIAL

## 2024-10-23 ENCOUNTER — APPOINTMENT (OUTPATIENT)
Dept: PHYSICAL THERAPY | Age: 54
End: 2024-10-23
Payer: COMMERCIAL

## 2024-10-25 ENCOUNTER — APPOINTMENT (OUTPATIENT)
Dept: PHYSICAL THERAPY | Age: 54
End: 2024-10-25
Payer: COMMERCIAL

## 2024-10-29 ENCOUNTER — APPOINTMENT (OUTPATIENT)
Dept: PHYSICAL THERAPY | Age: 54
End: 2024-10-29
Payer: COMMERCIAL

## 2024-12-13 ENCOUNTER — HOSPITAL ENCOUNTER (OUTPATIENT)
Age: 54
Discharge: HOME | End: 2024-12-13
Payer: COMMERCIAL

## 2024-12-13 DIAGNOSIS — E03.1 CONGENITAL HYPOTHYROIDISM WITHOUT GOITER: ICD-10-CM

## 2024-12-13 LAB
T4 FREE SERPL-MCNC: 1.3 NG/DL (ref 0.92–1.68)
TSH SERPL DL<=0.05 MIU/L-ACNC: 0.59 UIU/ML (ref 0.27–4.2)

## 2024-12-13 PROCEDURE — 84443 ASSAY THYROID STIM HORMONE: CPT

## 2024-12-13 PROCEDURE — 36415 COLL VENOUS BLD VENIPUNCTURE: CPT

## 2024-12-13 PROCEDURE — 84439 ASSAY OF FREE THYROXINE: CPT

## 2025-01-29 DIAGNOSIS — F34.1 DYSTHYMIA: ICD-10-CM

## 2025-01-29 RX ORDER — DESVENLAFAXINE 100 MG/1
TABLET, EXTENDED RELEASE ORAL
Qty: 180 TABLET | Refills: 3 | Status: SHIPPED | OUTPATIENT
Start: 2025-01-29

## 2025-01-29 RX ORDER — DESVENLAFAXINE 100 MG/1
TABLET, EXTENDED RELEASE ORAL
Qty: 180 TABLET | Refills: 0 | Status: SHIPPED | OUTPATIENT
Start: 2025-01-29 | End: 2025-01-29 | Stop reason: SDUPTHER

## 2025-02-18 ENCOUNTER — OFFICE VISIT (OUTPATIENT)
Dept: PRIMARY CARE CLINIC | Age: 55
End: 2025-02-18
Payer: COMMERCIAL

## 2025-02-18 VITALS
OXYGEN SATURATION: 97 % | SYSTOLIC BLOOD PRESSURE: 120 MMHG | HEART RATE: 70 BPM | BODY MASS INDEX: 21.79 KG/M2 | TEMPERATURE: 97.4 F | WEIGHT: 143.3 LBS | DIASTOLIC BLOOD PRESSURE: 68 MMHG

## 2025-02-18 DIAGNOSIS — R53.83 OTHER FATIGUE: ICD-10-CM

## 2025-02-18 DIAGNOSIS — J20.9 ACUTE BRONCHITIS, UNSPECIFIED ORGANISM: ICD-10-CM

## 2025-02-18 DIAGNOSIS — B34.9 ACUTE VIRAL SYNDROME: Primary | ICD-10-CM

## 2025-02-18 PROCEDURE — 99214 OFFICE O/P EST MOD 30 MIN: CPT | Performed by: NURSE PRACTITIONER

## 2025-02-18 RX ORDER — METHYLPREDNISOLONE 4 MG/1
TABLET ORAL
Qty: 1 KIT | Refills: 0 | Status: SHIPPED | OUTPATIENT
Start: 2025-02-18 | End: 2025-02-24

## 2025-02-18 SDOH — ECONOMIC STABILITY: FOOD INSECURITY: WITHIN THE PAST 12 MONTHS, YOU WORRIED THAT YOUR FOOD WOULD RUN OUT BEFORE YOU GOT MONEY TO BUY MORE.: NEVER TRUE

## 2025-02-18 SDOH — ECONOMIC STABILITY: FOOD INSECURITY: WITHIN THE PAST 12 MONTHS, THE FOOD YOU BOUGHT JUST DIDN'T LAST AND YOU DIDN'T HAVE MONEY TO GET MORE.: NEVER TRUE

## 2025-02-18 ASSESSMENT — PATIENT HEALTH QUESTIONNAIRE - PHQ9
8. MOVING OR SPEAKING SO SLOWLY THAT OTHER PEOPLE COULD HAVE NOTICED. OR THE OPPOSITE, BEING SO FIGETY OR RESTLESS THAT YOU HAVE BEEN MOVING AROUND A LOT MORE THAN USUAL: NOT AT ALL
SUM OF ALL RESPONSES TO PHQ QUESTIONS 1-9: 0
2. FEELING DOWN, DEPRESSED OR HOPELESS: NOT AT ALL
5. POOR APPETITE OR OVEREATING: NOT AT ALL
SUM OF ALL RESPONSES TO PHQ QUESTIONS 1-9: 0
SUM OF ALL RESPONSES TO PHQ9 QUESTIONS 1 & 2: 0
1. LITTLE INTEREST OR PLEASURE IN DOING THINGS: NOT AT ALL
9. THOUGHTS THAT YOU WOULD BE BETTER OFF DEAD, OR OF HURTING YOURSELF: NOT AT ALL
4. FEELING TIRED OR HAVING LITTLE ENERGY: NOT AT ALL
7. TROUBLE CONCENTRATING ON THINGS, SUCH AS READING THE NEWSPAPER OR WATCHING TELEVISION: NOT AT ALL
10. IF YOU CHECKED OFF ANY PROBLEMS, HOW DIFFICULT HAVE THESE PROBLEMS MADE IT FOR YOU TO DO YOUR WORK, TAKE CARE OF THINGS AT HOME, OR GET ALONG WITH OTHER PEOPLE: NOT DIFFICULT AT ALL
3. TROUBLE FALLING OR STAYING ASLEEP: NOT AT ALL
6. FEELING BAD ABOUT YOURSELF - OR THAT YOU ARE A FAILURE OR HAVE LET YOURSELF OR YOUR FAMILY DOWN: NOT AT ALL
SUM OF ALL RESPONSES TO PHQ QUESTIONS 1-9: 0
SUM OF ALL RESPONSES TO PHQ QUESTIONS 1-9: 0

## 2025-02-18 ASSESSMENT — ENCOUNTER SYMPTOMS
VOMITING: 0
HEMOPTYSIS: 0
RHINORRHEA: 0
HEARTBURN: 0
SINUS PRESSURE: 0
SWOLLEN GLANDS: 0
ABDOMINAL PAIN: 0
SHORTNESS OF BREATH: 0
COUGH: 1
SINUS PAIN: 0
DIARRHEA: 0
CHANGE IN BOWEL HABIT: 0
TROUBLE SWALLOWING: 0
NAUSEA: 0
WHEEZING: 0
VISUAL CHANGE: 0
SORE THROAT: 1

## 2025-02-18 NOTE — PROGRESS NOTES
Name: Delilah Motta  : 1970         Chief Complaint:     Chief Complaint   Patient presents with    Fatigue     Fatigue, HA, slight cough, chest tightness, indigestion x 3 days.        History of Present Illness:      Delilah Motta is a 54 y.o.  female who presents with Fatigue (Fatigue, HA, slight cough, chest tightness, indigestion x 3 days. )      Delilah is here today for an acute care office visit.    Fatigue  This is a new problem. The current episode started in the past 7 days. The problem occurs daily. The problem has been waxing and waning. Associated symptoms include anorexia, chills, congestion, coughing, fatigue, headaches, myalgias and a sore throat. Pertinent negatives include no abdominal pain, arthralgias, change in bowel habit, chest pain, diaphoresis, fever, joint swelling, nausea, neck pain, numbness, rash, swollen glands, urinary symptoms, vertigo, visual change, vomiting or weakness. Nothing aggravates the symptoms. She has tried rest and sleep for the symptoms. The treatment provided mild relief.   Cough  This is a new problem. The current episode started in the past 7 days. The problem has been waxing and waning. Episode frequency: INTERMITTENTLY. The cough is Productive of sputum. Associated symptoms include chills, headaches, myalgias, nasal congestion, postnasal drip and a sore throat. Pertinent negatives include no chest pain, ear congestion, ear pain, fever, heartburn, hemoptysis, rash, rhinorrhea, shortness of breath, sweats, weight loss or wheezing. The symptoms are aggravated by lying down. She has tried nothing for the symptoms. The treatment provided no relief. Her past medical history is significant for bronchitis and environmental allergies. There is no history of asthma, bronchiectasis, COPD, emphysema or pneumonia.         Past Medical History:     Past Medical History:   Diagnosis Date    Anxiety     Depression     Fibroadenoma of left breast     Hyperlipidemia

## 2025-02-18 NOTE — PATIENT INSTRUCTIONS
SURVEY:     You may be receiving a survey from [facility] [employer] regarding your visit today.     Please complete the survey to enable us to provide the highest quality of care to you and your family.     If you cannot score us a very good on any question, please call the office to discuss how we could have made your experience a very good one.     Thank you,    Alex Gonzalez, APRN-CNP  Peg Saul, APRN-CNP  Michaela, LPN  Sarahy, CMA  French, CMA  Ofelia, CMA  Charlotte, PCA  Norma, CMA  Tonia, PM

## 2025-02-24 ENCOUNTER — TELEPHONE (OUTPATIENT)
Dept: PRIMARY CARE CLINIC | Age: 55
End: 2025-02-24

## 2025-02-24 NOTE — TELEPHONE ENCOUNTER
Patient contacted insurance and Ozempic is not going to be covered. Patient wondering if there is something to maintain.     Please advise.

## 2025-02-24 NOTE — TELEPHONE ENCOUNTER
Patient contacted the office in regards to her ozempic, writer advised patient we are still waiting for her insurance, patient stated that she would be contacting insurance this morning to see what else needs done. Patient is questioning what she should do for the time being as she was due for her next dose 2/21.      Please advise.

## 2025-02-24 NOTE — TELEPHONE ENCOUNTER
Unfortunately I do not know of any other maintenance medications. She is not able to take Adipex due to her low BMI. She could check with her insurance company.